# Patient Record
Sex: FEMALE | Race: BLACK OR AFRICAN AMERICAN | NOT HISPANIC OR LATINO | URBAN - METROPOLITAN AREA
[De-identification: names, ages, dates, MRNs, and addresses within clinical notes are randomized per-mention and may not be internally consistent; named-entity substitution may affect disease eponyms.]

---

## 2017-09-13 ENCOUNTER — EMERGENCY (EMERGENCY)
Facility: HOSPITAL | Age: 77
LOS: 1 days | Discharge: HOME | End: 2017-09-13
Attending: EMERGENCY MEDICINE | Admitting: EMERGENCY MEDICINE
Payer: MEDICARE

## 2017-09-13 VITALS
OXYGEN SATURATION: 98 % | RESPIRATION RATE: 16 BRPM | DIASTOLIC BLOOD PRESSURE: 71 MMHG | HEART RATE: 79 BPM | TEMPERATURE: 98 F | SYSTOLIC BLOOD PRESSURE: 106 MMHG

## 2017-09-13 VITALS — OXYGEN SATURATION: 97 % | SYSTOLIC BLOOD PRESSURE: 154 MMHG | DIASTOLIC BLOOD PRESSURE: 82 MMHG | HEART RATE: 77 BPM

## 2017-09-13 LAB
ALBUMIN SERPL ELPH-MCNC: 4 G/DL — SIGNIFICANT CHANGE UP (ref 3.3–5)
ALP SERPL-CCNC: 86 U/L — SIGNIFICANT CHANGE UP (ref 40–120)
ALT FLD-CCNC: 16 U/L RC — SIGNIFICANT CHANGE UP (ref 10–45)
ANION GAP SERPL CALC-SCNC: 14 MMOL/L — SIGNIFICANT CHANGE UP (ref 5–17)
AST SERPL-CCNC: 17 U/L — SIGNIFICANT CHANGE UP (ref 10–40)
BILIRUB SERPL-MCNC: 0.5 MG/DL — SIGNIFICANT CHANGE UP (ref 0.2–1.2)
BUN SERPL-MCNC: 14 MG/DL — SIGNIFICANT CHANGE UP (ref 7–23)
CALCIUM SERPL-MCNC: 9.7 MG/DL — SIGNIFICANT CHANGE UP (ref 8.4–10.5)
CHLORIDE SERPL-SCNC: 95 MMOL/L — LOW (ref 96–108)
CO2 SERPL-SCNC: 27 MMOL/L — SIGNIFICANT CHANGE UP (ref 22–31)
CREAT SERPL-MCNC: 1.08 MG/DL — SIGNIFICANT CHANGE UP (ref 0.5–1.3)
GAS PNL BLDV: SIGNIFICANT CHANGE UP
GLUCOSE SERPL-MCNC: 247 MG/DL — HIGH (ref 70–99)
HCT VFR BLD CALC: 38.4 % — SIGNIFICANT CHANGE UP (ref 34.5–45)
HGB BLD-MCNC: 13 G/DL — SIGNIFICANT CHANGE UP (ref 11.5–15.5)
MCHC RBC-ENTMCNC: 29.9 PG — SIGNIFICANT CHANGE UP (ref 27–34)
MCHC RBC-ENTMCNC: 33.8 GM/DL — SIGNIFICANT CHANGE UP (ref 32–36)
MCV RBC AUTO: 88.5 FL — SIGNIFICANT CHANGE UP (ref 80–100)
PLATELET # BLD AUTO: 231 K/UL — SIGNIFICANT CHANGE UP (ref 150–400)
POTASSIUM SERPL-MCNC: 4.5 MMOL/L — SIGNIFICANT CHANGE UP (ref 3.5–5.3)
POTASSIUM SERPL-SCNC: 4.5 MMOL/L — SIGNIFICANT CHANGE UP (ref 3.5–5.3)
PROT SERPL-MCNC: 8.2 G/DL — SIGNIFICANT CHANGE UP (ref 6–8.3)
RBC # BLD: 4.34 M/UL — SIGNIFICANT CHANGE UP (ref 3.8–5.2)
RBC # FLD: 13.1 % — SIGNIFICANT CHANGE UP (ref 10.3–14.5)
SODIUM SERPL-SCNC: 136 MMOL/L — SIGNIFICANT CHANGE UP (ref 135–145)
WBC # BLD: 12.7 K/UL — HIGH (ref 3.8–10.5)
WBC # FLD AUTO: 12.7 K/UL — HIGH (ref 3.8–10.5)

## 2017-09-13 PROCEDURE — 99284 EMERGENCY DEPT VISIT MOD MDM: CPT

## 2017-09-13 PROCEDURE — 85027 COMPLETE CBC AUTOMATED: CPT

## 2017-09-13 PROCEDURE — 80053 COMPREHEN METABOLIC PANEL: CPT

## 2017-09-13 PROCEDURE — 71020: CPT | Mod: 26

## 2017-09-13 PROCEDURE — 99283 EMERGENCY DEPT VISIT LOW MDM: CPT | Mod: 25

## 2017-09-13 PROCEDURE — 71046 X-RAY EXAM CHEST 2 VIEWS: CPT

## 2017-09-13 NOTE — ED PROVIDER NOTE - MEDICAL DECISION MAKING DETAILS
Prelim cxr shows no acute findings.  Clinically stable for outpatient tx. CURB 65 score of 1.  To discharge with doxycycline 100mg tab bid x7 days. Prelim cxr shows no acute findings.  Clinically stable for outpatient tx. CURB 65 score of 1.  To discharge with doxycycline 100mg tab bid x7 days.  Vitals reassessed. /67 as per RN at 6:07PM.  Stable for d/c ***Attending Jn He MD***   76F hx HTN, DM presents with cough productive of yellow-green sputum and increased fatigue, myalgias x 4days.  Pt sent in by PMD for eval, concern for PNA.  Pt tolerating PO, but decreased fluid intake.  ROS otherwise negative.  VSS.  well-appearing, in no distress.  R-sided crackles and decreased BS.  will treat for clinical PNA.  Curb-65 score of 1.  attempted to d/w PMD who has not returned call.  will d.c with doxycycline and return precautions.

## 2017-09-13 NOTE — ED ADULT NURSE NOTE - OBJECTIVE STATEMENT
Recvd pt awake, alert and responsive to all stimuli.  no sob or respiratory distress noted at this time.  pt does c/o productive cough of thick yellow sputum x 2 days associated with pleuritic chest and throat pain. pt states that she went to her pmd for assessment and was sent to ed for further evaluation.   no fever or chills noted.  pt worked up and sent for radiological studies.  will continue to monitor upon her return.

## 2017-09-13 NOTE — ED PROVIDER NOTE - OBJECTIVE STATEMENT
76 year old female with DM and HTN who presents from PMD office due to referral to come in for 1 dose of IV abx and po outpatient discharge.  Patient reports that she has been afebrile. Admits to cough for 4 days.  Denies any shortness of breath, chest pain, lightheadedness, dizziness.   No underlying lung disease and has not been sick in the past year.

## 2017-09-13 NOTE — ED PROVIDER NOTE - ATTENDING CONTRIBUTION TO CARE
76F hx HTN, DM presents with cough productive of yellow-green sputum and increased fatigue, myalgias x 4days.  Pt sent in by PMD for eval, concern for PNA.  Pt tolerating PO, but decreased fluid intake.  ROS otherwise negative.  VSS.  well-appearing, in no distress.  R-sided crackles and decreased BS.  will treat for clinical PNA.  Curb-65 score of 1.  attempted to d/w PMD who has not returned call.  will d.c with doxycycline and return precautions. ***Attending Jn He MD***   76F hx HTN, DM presents with cough productive of yellow-green sputum and increased fatigue, myalgias x 4days.  Pt sent in by PMD for eval, concern for PNA.  Pt tolerating PO, but decreased fluid intake.  ROS otherwise negative.  VSS.  well-appearing, in no distress.  R-sided crackles and decreased BS.  will treat for clinical PNA.  Curb-65 score of 1.  attempted to d/w PMD who has not returned call.  will d.c with doxycycline and return precautions.

## 2018-09-09 ENCOUNTER — EMERGENCY (EMERGENCY)
Facility: HOSPITAL | Age: 78
LOS: 1 days | Discharge: HOME | End: 2018-09-09
Attending: EMERGENCY MEDICINE
Payer: MEDICARE

## 2018-09-09 VITALS
HEART RATE: 60 BPM | RESPIRATION RATE: 15 BRPM | OXYGEN SATURATION: 99 % | SYSTOLIC BLOOD PRESSURE: 201 MMHG | DIASTOLIC BLOOD PRESSURE: 86 MMHG | TEMPERATURE: 98 F

## 2018-09-09 PROCEDURE — 70450 CT HEAD/BRAIN W/O DYE: CPT | Mod: 26

## 2018-09-09 PROCEDURE — 99284 EMERGENCY DEPT VISIT MOD MDM: CPT

## 2018-09-09 NOTE — ED ADULT TRIAGE NOTE - CHIEF COMPLAINT QUOTE
Friday  right numbness in her arm getting worse cant hold anything in the hand swellling noted Friday  right numbness in her arm getting worse cant hold anything in the hand swellling noted   pt has elevated blood pressure in both arms pt took her meds today

## 2018-09-10 VITALS
SYSTOLIC BLOOD PRESSURE: 177 MMHG | DIASTOLIC BLOOD PRESSURE: 86 MMHG | RESPIRATION RATE: 16 BRPM | TEMPERATURE: 98 F | OXYGEN SATURATION: 100 % | HEART RATE: 64 BPM

## 2018-09-10 PROBLEM — I10 ESSENTIAL (PRIMARY) HYPERTENSION: Chronic | Status: ACTIVE | Noted: 2017-09-13

## 2018-09-10 PROBLEM — E11.9 TYPE 2 DIABETES MELLITUS WITHOUT COMPLICATIONS: Chronic | Status: ACTIVE | Noted: 2017-09-13

## 2018-09-10 PROCEDURE — 82962 GLUCOSE BLOOD TEST: CPT

## 2018-09-10 PROCEDURE — 70450 CT HEAD/BRAIN W/O DYE: CPT

## 2018-09-10 PROCEDURE — 99284 EMERGENCY DEPT VISIT MOD MDM: CPT | Mod: 25

## 2018-09-10 RX ORDER — ACETAMINOPHEN 500 MG
975 TABLET ORAL ONCE
Qty: 0 | Refills: 0 | Status: COMPLETED | OUTPATIENT
Start: 2018-09-10 | End: 2018-09-10

## 2018-09-10 RX ADMIN — Medication 975 MILLIGRAM(S): at 01:13

## 2018-09-10 NOTE — ED ADULT NURSE NOTE - CHIEF COMPLAINT QUOTE
Friday  right numbness in her arm getting worse cant hold anything in the hand swellling noted   pt has elevated blood pressure in both arms pt took her meds today

## 2018-09-10 NOTE — ED PROVIDER NOTE - ATTENDING CONTRIBUTION TO CARE
ATTENDING MD:  I, Sunny Cr, personally have seen and examined this patient.  I have discussed all aspects of care with the resident physician. Resident note reviewed and agree on plan of care and except where noted.  See HPI, PE, and MDM for details.

## 2018-09-10 NOTE — ED PROVIDER NOTE - PROGRESS NOTE DETAILS
CT head negative. Splint placed. stable for DC. Pt noted to be hypertensive, advised her to f/u with her PCP in the next 2-3 days regarding hypertension.

## 2018-09-10 NOTE — ED PROVIDER NOTE - OBJECTIVE STATEMENT
78 yo F hx of htn and dm presents with right hand numbness 2 days duration. Pt reports she initially thought she slept oddly on it Friday but the pain 78 yo F hx of htn and dm presents with right hand numbness 2 days duration. The symptoms onset Friday when she woke up after a long sleep with her R arm slung over a chair on an all day bus ride from [place]. She noted paresthesias and tingling on the dorsum of her R-arm and difficulty using her hand. She thought it just fell asleep and would improve but it has not improved much over the next day so she presented to the ED for evaluation. She denies headache, neck trauma or pain, other weakness or numbness. No prior hx of stroke or blood clots, no Afib.

## 2018-09-10 NOTE — ED PROVIDER NOTE - CARE PLAN
Principal Discharge DX:	Radial nerve palsy, right  Secondary Diagnosis:	Asymptomatic hypertension Principal Discharge DX:	Radial nerve palsy, right  Assessment and plan of treatment:	1) Please follow-up with your primary care doctor in the next 1-2 days.  Please call tomorrow for an appointment.  If you cannot follow-up with your primary care doctor please return to the ED for any urgent issues.  2) You were given a copy of the tests performed today.  Please bring the results with you and review them with your primary care doctor.  3) If you have any worsening of symptoms or any other concerns please return to the ED immediately. This includes chest pain, shortness of breath, fever/chills, increased pain, or any other concerns.  4) Please continue taking your home medications as directed.  Secondary Diagnosis:	Asymptomatic hypertension Principal Discharge DX:	Radial nerve palsy, right  Assessment and plan of treatment:	You were seen and evaluated in the emergency department for numbness in the right hand. Your exam, Is highly suggestive of a peripheral neuropathy. We did not find evidence for a dangerous cause of your pain. This does not mean your pain is not symptoms are not concerning, only that we could not find a dangerous or life-threatening cause. Please read the attached information sheets as they will provide useful information regarding your condition.    You may take up to 400mg of ibuprofen (Motrin, Advil) every 6 hours as needed for pain. Please take ibuprofen with food if possible to prevent stomach upset. You may also take up to 1000mg of acetaminophen (Tylenol) every 6 hours as needed for pain. It is ok to mix these medications with each other. Do not mix them with other pain medications such as naproxen (Alieve) or asprin unless specifically instructed by your doctor. Many prescription pain medications and cough medications contain acetaminophen. If you take these medications, please discuss with your provider or primary care doctor if you need to adjust the dose of acetaminophen you can take. You may apply ice to the affected area for no more than 20 minutes as needed for comfort. You may apply ice every 2-4 times a day as needed.   It is important to understand that while your workup was reassuring, no medical workup can completely exclude all concerning conditions. Therefore, we ask that you please return if you develop new or worsening pain, trouble breathing, fainting (or feel that you might faint), weakness, inability to perform your daily activities, or other concerning new symptoms (such as listed on the attached information sheets. Please read the attached information sheets. Take your medication as prescribed.    Please be sure to follow up with your regular doctor in the next 7-10 days.   Seek immediate medical care for any new/worsening signs or symptoms.  Secondary Diagnosis:	Asymptomatic hypertension

## 2018-09-10 NOTE — ED PROVIDER NOTE - PLAN OF CARE
1) Please follow-up with your primary care doctor in the next 1-2 days.  Please call tomorrow for an appointment.  If you cannot follow-up with your primary care doctor please return to the ED for any urgent issues.  2) You were given a copy of the tests performed today.  Please bring the results with you and review them with your primary care doctor.  3) If you have any worsening of symptoms or any other concerns please return to the ED immediately. This includes chest pain, shortness of breath, fever/chills, increased pain, or any other concerns.  4) Please continue taking your home medications as directed. You were seen and evaluated in the emergency department for numbness in the right hand. Your exam, Is highly suggestive of a peripheral neuropathy. We did not find evidence for a dangerous cause of your pain. This does not mean your pain is not symptoms are not concerning, only that we could not find a dangerous or life-threatening cause. Please read the attached information sheets as they will provide useful information regarding your condition.    You may take up to 400mg of ibuprofen (Motrin, Advil) every 6 hours as needed for pain. Please take ibuprofen with food if possible to prevent stomach upset. You may also take up to 1000mg of acetaminophen (Tylenol) every 6 hours as needed for pain. It is ok to mix these medications with each other. Do not mix them with other pain medications such as naproxen (Alieve) or asprin unless specifically instructed by your doctor. Many prescription pain medications and cough medications contain acetaminophen. If you take these medications, please discuss with your provider or primary care doctor if you need to adjust the dose of acetaminophen you can take. You may apply ice to the affected area for no more than 20 minutes as needed for comfort. You may apply ice every 2-4 times a day as needed.   It is important to understand that while your workup was reassuring, no medical workup can completely exclude all concerning conditions. Therefore, we ask that you please return if you develop new or worsening pain, trouble breathing, fainting (or feel that you might faint), weakness, inability to perform your daily activities, or other concerning new symptoms (such as listed on the attached information sheets. Please read the attached information sheets. Take your medication as prescribed.    Please be sure to follow up with your regular doctor in the next 7-10 days.   Seek immediate medical care for any new/worsening signs or symptoms.

## 2018-09-10 NOTE — ED PROVIDER NOTE - PHYSICAL EXAMINATION
ATTENDING MD Shaye: GEN: well appearing, NAD, AOx4  HEAD: NCAT  EYES: eyes clear, pupils equal round and reactive to light, extra-occular movements are intact, full visual fields  ENT: mucus membranes are moist, oropharynx is within normal limits, neck supple  CV: normal rate, regular rhythm, S1, S2, 2+ radial pulses bilaterally, <2 sec cap refill in all digits  RESP: lungs clear to auscultation bilaterally, equal breath sounds bilaterally, unlabored  ABD: the abdomen is soft, nontender, and nondistended, there are no palpable masses or organomegaly  SKIN: warm, dry, no rash  MSK: No C, T or L spine tenderness, deformity or stepoff. No deformity or tenderness throughout RUE or other extremity exam. negative spurling bilaterally.   NEURO: CNII-XII intact. LUE, RLE, LLE with 5/5 strength throughout. sensation intact to light touch throughout trunk and extremities except for dorsum of R-hand and forearm where it is present but sensation decreased. RUE notable for 5/5 strength in shoulder shrug, tricep, bicep, wrist flexion. 4/5 brachioradialis strength. 2/5 strength for wrist extensors and finger extensors, 5/5 strength in finger flexors. weakness in finger and thumb abduction. no thenar wasting, negative phalen and tinel. wrist adductors (ulnar deviation) intact, weakness of abductors (radial deviation) 2+ tricep reflex bilaterally, unable to perform brachioradilis sufficiently on right to interpret

## 2018-09-10 NOTE — ED PROCEDURE NOTE - ATTENDING CONTRIBUTION TO CARE
ATTENDING MD:  I, Sunny Cr, was available in the Emergency Department throughout the entire procedure and I was present during the key portions. I agree with the procedure as documented.

## 2018-09-10 NOTE — ED PROVIDER NOTE - MEDICAL DECISION MAKING DETAILS
ATTENDING MD Shaye: Pt presents with isolated RUE weaknessa nds ensory changes. 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 Will place volar wrist splint and refer for f/u with neurology, ortho PRN

## 2018-09-10 NOTE — ED PROCEDURE NOTE - CPROC ED POST PROC CARE GUIDE1
Instructed patient/caregiver to follow-up with primary care physician./instructed patient to follow up with neurology and ortho PRN/Verbal/written post procedure instructions were given to patient/caregiver.

## 2018-09-10 NOTE — ED ADULT NURSE NOTE - OBJECTIVE STATEMENT
78 y/o female presents to the ED with R arm numbness since Friday and right sided neck pain with movement. Denies HA, extremity weakness, slurred speech and facial droop. R arm has decreased sensation when compared to L arm.  strong and equal bilaterally. Patient has full movement of bilateral extremities but R hand is stiff and postured inward towards body. Patient speaking in complete clear sentences. Pupils PERRLA. Capillary refill less than 2 seconds. Skin warm and dry. Peripheral pulses equal bilaterally. Denies CP, SOB, N/V/D, abdominal pain and dysuria. A&Ox3. VSS

## 2018-09-10 NOTE — ED PROCEDURE NOTE - NS ED PERI NEURO POS
stable/Post-application: Motor, sensory, and vascular responses NOT intact in the injured extremity./The patient/caregiver verbalized understanding of how to care for the injured extremity with splint./Pre-application: Motor, sensory, and vascular responses NOT intact in the injured extremity.

## 2020-02-13 ENCOUNTER — APPOINTMENT (OUTPATIENT)
Dept: CARDIOLOGY | Facility: CLINIC | Age: 80
End: 2020-02-13
Payer: MEDICARE

## 2020-02-13 ENCOUNTER — NON-APPOINTMENT (OUTPATIENT)
Age: 80
End: 2020-02-13

## 2020-02-13 VITALS
DIASTOLIC BLOOD PRESSURE: 80 MMHG | SYSTOLIC BLOOD PRESSURE: 144 MMHG | HEART RATE: 70 BPM | HEIGHT: 66 IN | OXYGEN SATURATION: 98 % | WEIGHT: 156 LBS | BODY MASS INDEX: 25.07 KG/M2

## 2020-02-13 DIAGNOSIS — R09.89 OTHER SPECIFIED SYMPTOMS AND SIGNS INVOLVING THE CIRCULATORY AND RESPIRATORY SYSTEMS: ICD-10-CM

## 2020-02-13 DIAGNOSIS — Z83.3 FAMILY HISTORY OF DIABETES MELLITUS: ICD-10-CM

## 2020-02-13 PROCEDURE — 99205 OFFICE O/P NEW HI 60 MIN: CPT

## 2020-02-13 PROCEDURE — 93000 ELECTROCARDIOGRAM COMPLETE: CPT

## 2020-02-13 NOTE — REASON FOR VISIT
[Abnormal ECG] : an abnormal ECG [Initial Evaluation] : an initial evaluation of [Hyperlipidemia] : hyperlipidemia [Hypertension] : hypertension

## 2020-02-26 ENCOUNTER — APPOINTMENT (OUTPATIENT)
Dept: CARDIOLOGY | Facility: CLINIC | Age: 80
End: 2020-02-26
Payer: MEDICARE

## 2020-02-26 PROCEDURE — 93306 TTE W/DOPPLER COMPLETE: CPT

## 2020-02-26 PROCEDURE — 93880 EXTRACRANIAL BILAT STUDY: CPT

## 2020-03-18 ENCOUNTER — APPOINTMENT (OUTPATIENT)
Dept: CARDIOLOGY | Facility: CLINIC | Age: 80
End: 2020-03-18

## 2020-03-19 NOTE — PHYSICAL EXAM
[Normal Appearance] : normal appearance [Well Groomed] : well groomed [General Appearance - Well Developed] : well developed [General Appearance - Well Nourished] : well nourished [No Deformities] : no deformities [Normal Conjunctiva] : the conjunctiva exhibited no abnormalities [Eyelids - No Xanthelasma] : the eyelids demonstrated no xanthelasmas [General Appearance - In No Acute Distress] : no acute distress [No Oral Cyanosis] : no oral cyanosis [No Oral Pallor] : no oral pallor [Normal Oral Mucosa] : normal oral mucosa [Normal Jugular Venous A Waves Present] : normal jugular venous A waves present [Normal Jugular Venous V Waves Present] : normal jugular venous V waves present [No Jugular Venous Hinojosa A Waves] : no jugular venous hinojosa A waves [Murmurs] : no murmurs present [Heart Rate And Rhythm] : heart rate and rhythm were normal [Heart Sounds] : normal S1 and S2 [Edema] : no peripheral edema present [Left Carotid Bruit] : left carotid bruit heard [1+] : right 1+ [Auscultation Breath Sounds / Voice Sounds] : lungs were clear to auscultation bilaterally [Respiration, Rhythm And Depth] : normal respiratory rhythm and effort [Exaggerated Use Of Accessory Muscles For Inspiration] : no accessory muscle use [Abdomen Soft] : soft [Bowel Sounds] : normal bowel sounds [Abdomen Tenderness] : non-tender [Abnormal Walk] : normal gait [Gait - Sufficient For Exercise Testing] : the gait was sufficient for exercise testing [Petechial Hemorrhages (___cm)] : no petechial hemorrhages [Nail Clubbing] : no clubbing of the fingernails [Cyanosis, Localized] : no localized cyanosis [Skin Color & Pigmentation] : normal skin color and pigmentation [] : no ischemic changes [Skin Lesions] : no skin lesions [No Skin Ulcers] : no skin ulcer [No Venous Stasis] : no venous stasis [Oriented To Time, Place, And Person] : oriented to person, place, and time [No Xanthoma] : no  xanthoma was observed [Affect] : the affect was normal [No Anxiety] : not feeling anxious [Mood] : the mood was normal [Right Carotid Bruit] : no bruit heard over the right carotid [Bruit] : no bruit heard

## 2020-03-19 NOTE — HISTORY OF PRESENT ILLNESS
[FreeTextEntry1] : Luisa is a pleasant 79-year-old female with hypertension, dyslipidemia and type 2 diabetes comes to the office to establish cardiac care. She is seen to have an abnormal ECG. Reportedly eating generally healthy and a balanced diet.Perhaps on more moderate activities there is an element of exertional dyspnea. No active chest pain noted.

## 2020-03-19 NOTE — DISCUSSION/SUMMARY
[FreeTextEntry1] : Continue with her current antihypertensive medication regimen of Toprol, lisinopril, hydrochlorothiazide amlodipine. Stay on atorvastatin for lipid-lowering along with glycemic lower medications for diabetic control. To further cardiac risk stratify, I have ordered an echocardiogram to assess LV function and valvular status. To better assess carotid disease burden and serve as a cardiovascular risk marker, especially with the possibility of a left carotid bruit on exam, I've ordered a carotid bilateral duplex study. To assess underlying coronary disease burden with current cardiovascular disease risk factors and symptoms, I have ordered a nuclear perfusion stress test.I recommended a heart healthy lifestyle including a low-saturated fat, low cholesterol diet with improved aerobic physical fitness over time for cardiovascular benefits. Carbohydrate and sodium restriction along with weight loss over time encouraged. We will call the patient with test results and followup with you for general care. See me in 3 months or sooner if needed.

## 2020-08-28 NOTE — ED ADULT NURSE NOTE - DISCHARGE DATE/TIME
Problem: Mobility Impaired (Adult and Pediatric)  Goal: *Acute Goals and Plan of Care (Insert Text)  Description: FUNCTIONAL STATUS PRIOR TO ADMISSION: Patient reports she was ambulatory in her house without AD, denies any recent falls. HOME SUPPORT PRIOR TO ADMISSION: Patient lives with two of her sons (only one can provide assist) in a tri level home. Patient has had multiple hospital admissions for DKA and requires increased care. Physical Therapy Goals  Initiated 8/28/2020  1. Patient will move from supine to sit and sit to supine  in bed with independence within 7 day(s). 2.  Patient will transfer from bed to chair and chair to bed with independence using the least restrictive device within 7 day(s). 3.  Patient will perform sit to stand with independence within 7 day(s). 4.  Patient will ambulate with supervision/set-up for 200 feet with the least restrictive device within 7 day(s). 5.  Patient will ascend/descend 6 stairs with  handrail(s) with minimal assistance/contact guard assist within 7 day(s). Outcome: Not Met   PHYSICAL THERAPY EVALUATION  Patient: Edmund Snyder (4 y.o. female)  Date: 8/28/2020  Primary Diagnosis: DKA (diabetic ketoacidoses) ([facility] [address] [E11.10]        Precautions: falls       ASSESSMENT  Based on the objective data described below, the patient presents with general weakness and decreased tolerance of activity, unsteady gait, mild confusion and forgetfulness, and impaired balance/decreased safety awareness which increases falls risk. Patient received in bed and agreeable to participate, able to come to edge with supervision and verbal cuing for sequence, sitting balance is intact. Patient stood with CGA and noted to have soiled chucks on bed, ambulated into bathroom to use toilet and was able to perform some self care for clean up in standing position. Patient washed hands at sink without cuing, but needed assist with walker management for safety.   Patient ambulated in the hallway x approx 120 feet with RW and CGA, mild unsteadiness but no overt LOB. Patient was fatigued post activity, VSS on RA, and left sitting up in chair. Patient should be able to return home with increased family assistance for mobility and med management, HHPT and personal care aid. Patient is not safe to be left unsupervised, and would also benefit from a rolling walker for home (unclear if she has one or not). Current Level of Function Impacting Discharge (mobility/balance): CGA, verbal cuing for safety and sequence    Functional Outcome Measure: The patient scored 60/100 on the Barthel outcome measure which is indicative of moderate functional impairment      Other factors to consider for discharge: increased falls risk, multiple re-hospitalizations, needs increased assist and supervision at home     Patient will benefit from skilled therapy intervention to address the above noted impairments. PLAN :  Recommendations and Planned Interventions: bed mobility training, transfer training, gait training, therapeutic exercises, patient and family training/education, and therapeutic activities      Frequency/Duration: Patient will be followed by physical therapy:  4 times a week to address goals. Recommendation for discharge: (in order for the patient to meet his/her long term goals)  Physical therapy at least 2 days/week in the home AND ensure assist and/or supervision for safety with mobility     This discharge recommendation:  Has been made in collaboration with the attending provider and/or case management    IF patient discharges home will need the following DME: rolling walker         SUBJECTIVE:   Patient stated my legs feel wobbly.     OBJECTIVE DATA SUMMARY:   HISTORY:    Past Medical History:   Diagnosis Date    Heart failure ([address]     unknown to family    Hypertension     Stroke [facility])      Past Surgical History:   Procedure Laterality Date    HX GYN      HX HEENT thyroidectomy    HX HYSTERECTOMY      REMOVAL GALLBLADDER      THYROIDECTOMY         Personal factors and/or comorbidities impacting plan of care: poorly controlled DM, frequent hosp stays    Home Situation  Home Environment: Private residence  One/Two Story Residence: Two story  Living Alone: No  Support Systems: Child(oumar), Family member(s)  Patient Expects to be Discharged to[de-identified] Private residence  Current DME Used/Available at Home: None    EXAMINATION/PRESENTATION/DECISION MAKING:   Critical Behavior:  Neurologic State: Alert, Confused  Orientation Level: Oriented to person, Disoriented to time, Disoriented to situation, Disoriented to place  Cognition: Follows commands     Hearing: Auditory  Auditory Impairment: None  Range Of Motion:  AROM: Within functional limits           PROM: Within functional limits           Strength:    Strength: Generally decreased, functional                    Tone & Sensation:   Tone: Normal              Sensation: Intact               Coordination:  Coordination: Within functional limits  Vision:      Functional Mobility:  Bed Mobility:  Rolling: Supervision  Supine to Sit: Supervision        Transfers:  Sit to Stand: Contact guard assistance  Stand to Sit: Contact guard assistance        Bed to Chair: Contact guard assistance              Balance:   Sitting: Intact  Standing: Impaired  Standing - Static: Constant support;Good  Standing - Dynamic : Constant support; Fair  Ambulation/Gait Training:  Distance (ft): 120 Feet (ft)  Assistive Device: Gait belt;Walker, rolling  Ambulation - Level of Assistance: Contact guard assistance        Gait Abnormalities: Decreased step clearance        Base of Support: Widened     Speed/Deepika: Pace decreased (<100 feet/min)  Step Length: Left shortened;Right shortened                     Stairs:                Functional Measure:  Barthel Index:    Bathin  Bladder: 5  Bowels: 5  Groomin  Dressin  Feeding: 10  Mobility: 10  Stairs: 5  Toilet Use: 5  Transfer (Bed to Chair and Back): 10  Total: 60/100       The Barthel ADL Index: Guidelines  1. The index should be used as a record of what a patient does, not as a record of what a patient could do. 2. The main aim is to establish degree of independence from any help, physical or verbal, however minor and for whatever reason. 3. The need for supervision renders the patient not independent. 4. A patient's performance should be established using the best available evidence. Asking the patient, friends/relatives and nurses are the usual sources, but direct observation and common sense are also important. However direct testing is not needed. 5. Usually the patient's performance over the preceding 24-48 hours is important, but occasionally longer periods will be relevant. 6. Middle categories imply that the patient supplies over 50 per cent of the effort. 7. Use of aids to be independent is allowed. Kristan Lopez., Barthel, D.W. (1920). Functional evaluation: the Barthel Index. [facility] (14)2. Laura Gonzales mathieu RAMO DiehlF, Fransisco Dowling., Saw Mendez., [place], [address] [place] [address] (1999). Measuring the change indisability after inpatient rehabilitation; comparison of the responsiveness of the Barthel Index and Functional [place] Measure. Journal of Neurology, Neurosurgery, and Psychiatry, 66(4), 312-760. JING Bhardwaj.CELINE, BRENNA Mckay, & Susan Salazar, M.A. (2004.) Assessment of post-stroke quality of life in cost-effectiveness studies: The usefulness of the Barthel Index and the EuroQoL-5D.  Quality of Life Research, 15, 350-20           Physical Therapy Evaluation Charge Determination   History Examination Presentation Decision-Making   MEDIUM  Complexity : 1-2 comorbidities / personal factors will impact the outcome/ POC  MEDIUM Complexity : 3 Standardized tests and measures addressing body structure, function, activity limitation and / or participation in recreation  MEDIUM Complexity : Evolving with changing characteristics  MEDIUM Complexity : FOTO score of 26-74      Based on the above components, the patient evaluation is determined to be of the following complexity level: MEDIUM    Pain Rating:      Activity Tolerance:   Fair  Please refer to the flowsheet for vital signs taken during this treatment. After treatment patient left in no apparent distress:   Sitting in chair, Call bell within reach, and Bed / chair alarm activated    COMMUNICATION/EDUCATION:   The patients plan of care was discussed with: Registered nurse and Case management. Fall prevention education was provided and the patient/caregiver indicated understanding. and Patient/family agree to work toward stated goals and plan of care.     Thank you for this referral.  Vin Hummel, PT   Time Calculation: 47 mins 10-Sep-2018 02:51

## 2021-04-15 NOTE — ED ADULT NURSE NOTE - RESPIRATORY WDL
Addended byAlessandro NUNES on: 4/15/2021 03:17 PM     Modules accepted: Orders
Breathing spontaneous and unlabored. Breath sounds clear and equal bilaterally with regular rhythm.

## 2021-11-24 ENCOUNTER — NON-APPOINTMENT (OUTPATIENT)
Age: 81
End: 2021-11-24

## 2021-11-24 ENCOUNTER — APPOINTMENT (OUTPATIENT)
Dept: INTERNAL MEDICINE | Facility: CLINIC | Age: 81
End: 2021-11-24
Payer: MEDICARE

## 2021-11-24 VITALS
DIASTOLIC BLOOD PRESSURE: 80 MMHG | HEART RATE: 76 BPM | WEIGHT: 152 LBS | BODY MASS INDEX: 24.43 KG/M2 | SYSTOLIC BLOOD PRESSURE: 125 MMHG | OXYGEN SATURATION: 98 % | HEIGHT: 66 IN | TEMPERATURE: 97.6 F

## 2021-11-24 VITALS — DIASTOLIC BLOOD PRESSURE: 80 MMHG | SYSTOLIC BLOOD PRESSURE: 150 MMHG

## 2021-11-24 DIAGNOSIS — Z23 ENCOUNTER FOR IMMUNIZATION: ICD-10-CM

## 2021-11-24 DIAGNOSIS — Z80.3 FAMILY HISTORY OF MALIGNANT NEOPLASM OF BREAST: ICD-10-CM

## 2021-11-24 DIAGNOSIS — Z84.1 FAMILY HISTORY OF DISORDERS OF KIDNEY AND URETER: ICD-10-CM

## 2021-11-24 DIAGNOSIS — Z63.5 DISRUPTION OF FAMILY BY SEPARATION AND DIVORCE: ICD-10-CM

## 2021-11-24 DIAGNOSIS — Z87.891 PERSONAL HISTORY OF NICOTINE DEPENDENCE: ICD-10-CM

## 2021-11-24 PROCEDURE — 99204 OFFICE O/P NEW MOD 45 MIN: CPT | Mod: 25

## 2021-11-24 PROCEDURE — 93000 ELECTROCARDIOGRAM COMPLETE: CPT

## 2021-11-24 PROCEDURE — G0009: CPT

## 2021-11-24 PROCEDURE — G0439: CPT

## 2021-11-24 PROCEDURE — 90670 PCV13 VACCINE IM: CPT

## 2021-11-24 RX ORDER — METFORMIN HYDROCHLORIDE 500 MG/1
500 TABLET, COATED ORAL
Qty: 180 | Refills: 3 | Status: DISCONTINUED | COMMUNITY
End: 2021-11-24

## 2021-11-24 SDOH — SOCIAL STABILITY - SOCIAL INSECURITY: DISRUPTION OF FAMILY BY SEPARATION AND DIVORCE: Z63.5

## 2021-11-24 NOTE — HEALTH RISK ASSESSMENT
[Good] : ~his/her~  mood as  good [Never (0 pts)] : Never (0 points) [No] : In the past 12 months have you used drugs other than those required for medical reasons? No [No falls in past year] : Patient reported no falls in the past year [0] : 2) Feeling down, depressed, or hopeless: Not at all (0) [PHQ-2 Negative - No further assessment needed] : PHQ-2 Negative - No further assessment needed [Patient reported mammogram was normal] : Patient reported mammogram was normal [Patient reported PAP Smear was normal] : Patient reported PAP Smear was normal [Patient reported colonoscopy was abnormal] : Patient reported colonoscopy was abnormal [HIV test declined] : HIV test declined [Hepatitis C test declined] : Hepatitis C test declined [Change in mental status noted] : Change in mental status noted [None] : None [With Family] : lives with family [Retired] : retired [High School] : high school [# Of Children ___] : has [unfilled] children [Feels Safe at Home] : Feels safe at home [Fully functional (bathing, dressing, toileting, transferring, walking, feeding)] : Fully functional (bathing, dressing, toileting, transferring, walking, feeding) [Fully functional (using the telephone, shopping, preparing meals, housekeeping, doing laundry, using] : Fully functional and needs no help or supervision to perform IADLs (using the telephone, shopping, preparing meals, housekeeping, doing laundry, using transportation, managing medications and managing finances) [Smoke Detector] : smoke detector [Carbon Monoxide Detector] : carbon monoxide detector [Seat Belt] :  uses seat belt [Sunscreen] : uses sunscreen [With Patient/Caregiver] : , with patient/caregiver [Designated Healthcare Proxy] : Designated healthcare proxy [Name: ___] : Health Care Proxy's Name: [unfilled]  [Relationship: ___] : Relationship: [unfilled] [] : No [Audit-CScore] : 0 [[id number]] : 0 [High Risk Behavior] : no high risk behavior [Reports changes in hearing] : Reports no changes in hearing [Reports changes in vision] : Reports no changes in vision [Guns at Home] : no guns at home [Travel to Developing Areas] : does not  travel to developing areas [MammogramDate] : 01/19 [MammogramComments] : as per pt [PapSmearDate] : 10/21 [PapSmearComments] : as per pt. seen GYN 1 mo ago [ColonoscopyDate] : 01/19 [ColonoscopyComments] : polyps as per pt-pt to release [de-identified] : delayed short term memory- names of occasional acquaintances. able to do finance [de-identified] : daughter [FreeTextEntry3] : separate [de-identified] : seen ophth this yr [de-identified] : pt has dentures [AdvancecareDate] : 11/21

## 2021-11-24 NOTE — PHYSICAL EXAM
[No Acute Distress] : no acute distress [Well Nourished] : well nourished [Well Developed] : well developed [Well-Appearing] : well-appearing [Normal Sclera/Conjunctiva] : normal sclera/conjunctiva [PERRL] : pupils equal round and reactive to light [Normal Outer Ear/Nose] : the outer ears and nose were normal in appearance [Normal Oropharynx] : the oropharynx was normal [Normal TMs] : both tympanic membranes were normal [No Lymphadenopathy] : no lymphadenopathy [Supple] : supple [No Respiratory Distress] : no respiratory distress  [No Accessory Muscle Use] : no accessory muscle use [Clear to Auscultation] : lungs were clear to auscultation bilaterally [Normal Rate] : normal rate  [Regular Rhythm] : with a regular rhythm [Normal S1, S2] : normal S1 and S2 [No Carotid Bruits] : no carotid bruits [Pedal Pulses Present] : the pedal pulses are present [No Edema] : there was no peripheral edema [Soft] : abdomen soft [Non Tender] : non-tender [Non-distended] : non-distended [No Masses] : no abdominal mass palpated [Normal Bowel Sounds] : normal bowel sounds [Normal Supraclavicular Nodes] : no supraclavicular lymphadenopathy [Normal Axillary Nodes] : no axillary lymphadenopathy [Normal Posterior Cervical Nodes] : no posterior cervical lymphadenopathy [Normal Anterior Cervical Nodes] : no anterior cervical lymphadenopathy [Normal Inguinal Nodes] : no inguinal lymphadenopathy [Grossly Normal Strength/Tone] : grossly normal strength/tone [No Focal Deficits] : no focal deficits [Normal Gait] : normal gait [Normal Affect] : the affect was normal [Normal Insight/Judgement] : insight and judgment were intact [de-identified] : thyroid nodule [de-identified] : murmur

## 2021-11-24 NOTE — PLAN
[FreeTextEntry1] : rec Moderna COVId booster\par EKG- NSR 71 T wave inv - no chg c/o 2/13/20\par pt to bring in her medicationw next visit\par pneum 13 vac given\par

## 2021-11-24 NOTE — HISTORY OF PRESENT ILLNESS
[FreeTextEntry1] : CPE [de-identified] : vaccine- refused flu vac, pneum vac. got J+J vac- 2/21\par exercise- walking 20 min- 2-3 day/wk\par DM- pt is unclear about the meds she is taking.  compliant w  meds.  non compliance w diet- 3 /wk candy\par HTN- reviewed Cardio notes, EKG 2/13/20 .  reviewed 2/26/20 ECHO- dilated LA, mild TR, MR.  home /75.  not taken BP meds today\par Lipid-? unclear if she is take statin.

## 2021-11-29 LAB
ALBUMIN SERPL ELPH-MCNC: 4.6 G/DL
ALP BLD-CCNC: 100 U/L
ALT SERPL-CCNC: 16 U/L
ANION GAP SERPL CALC-SCNC: 16 MMOL/L
AST SERPL-CCNC: 16 U/L
BASOPHILS # BLD AUTO: 0.06 K/UL
BASOPHILS NFR BLD AUTO: 0.9 %
BILIRUB SERPL-MCNC: 0.4 MG/DL
BUN SERPL-MCNC: 16 MG/DL
CALCIUM SERPL-MCNC: 10.2 MG/DL
CHLORIDE SERPL-SCNC: 100 MMOL/L
CHOLEST SERPL-MCNC: 299 MG/DL
CO2 SERPL-SCNC: 24 MMOL/L
CREAT SERPL-MCNC: 1.03 MG/DL
CREAT SPEC-SCNC: 357 MG/DL
EOSINOPHIL # BLD AUTO: 0.14 K/UL
EOSINOPHIL NFR BLD AUTO: 2 %
ESTIMATED AVERAGE GLUCOSE: 272 MG/DL
GLUCOSE SERPL-MCNC: 182 MG/DL
HBA1C MFR BLD HPLC: 11.1 %
HCT VFR BLD CALC: 42.8 %
HDLC SERPL-MCNC: 52 MG/DL
HGB BLD-MCNC: 13.5 G/DL
IMM GRANULOCYTES NFR BLD AUTO: 0.1 %
LDLC SERPL CALC-MCNC: 213 MG/DL
LDLC SERPL DIRECT ASSAY-MCNC: 218 MG/DL
LYMPHOCYTES # BLD AUTO: 3.03 K/UL
LYMPHOCYTES NFR BLD AUTO: 43.1 %
MAN DIFF?: NORMAL
MCHC RBC-ENTMCNC: 28.8 PG
MCHC RBC-ENTMCNC: 31.5 GM/DL
MCV RBC AUTO: 91.5 FL
MICROALBUMIN 24H UR DL<=1MG/L-MCNC: 7.7 MG/DL
MICROALBUMIN/CREAT 24H UR-RTO: 21 MG/G
MONOCYTES # BLD AUTO: 0.58 K/UL
MONOCYTES NFR BLD AUTO: 8.3 %
NEUTROPHILS # BLD AUTO: 3.21 K/UL
NEUTROPHILS NFR BLD AUTO: 45.6 %
NONHDLC SERPL-MCNC: 247 MG/DL
PLATELET # BLD AUTO: 241 K/UL
POTASSIUM SERPL-SCNC: 4.8 MMOL/L
PROT SERPL-MCNC: 7.6 G/DL
RBC # BLD: 4.68 M/UL
RBC # FLD: 13.9 %
SODIUM SERPL-SCNC: 140 MMOL/L
T4 FREE SERPL-MCNC: 1.3 NG/DL
TRIGL SERPL-MCNC: 171 MG/DL
TSH SERPL-ACNC: 0.86 UIU/ML
WBC # FLD AUTO: 7.03 K/UL

## 2021-12-20 ENCOUNTER — APPOINTMENT (OUTPATIENT)
Dept: ULTRASOUND IMAGING | Facility: CLINIC | Age: 81
End: 2021-12-20

## 2021-12-22 RX ORDER — ATORVASTATIN CALCIUM 20 MG/1
20 TABLET, FILM COATED ORAL
Qty: 90 | Refills: 1 | Status: DISCONTINUED | COMMUNITY
End: 2021-12-22

## 2021-12-29 ENCOUNTER — APPOINTMENT (OUTPATIENT)
Dept: CARDIOLOGY | Facility: CLINIC | Age: 81
End: 2021-12-29
Payer: MEDICARE

## 2021-12-29 PROCEDURE — 93306 TTE W/DOPPLER COMPLETE: CPT

## 2021-12-31 ENCOUNTER — NON-APPOINTMENT (OUTPATIENT)
Age: 81
End: 2021-12-31

## 2022-01-20 ENCOUNTER — APPOINTMENT (OUTPATIENT)
Dept: CARDIOLOGY | Facility: CLINIC | Age: 82
End: 2022-01-20

## 2022-02-03 ENCOUNTER — APPOINTMENT (OUTPATIENT)
Dept: CARDIOLOGY | Facility: CLINIC | Age: 82
End: 2022-02-03
Payer: MEDICARE

## 2022-02-03 ENCOUNTER — NON-APPOINTMENT (OUTPATIENT)
Age: 82
End: 2022-02-03

## 2022-02-03 VITALS
DIASTOLIC BLOOD PRESSURE: 80 MMHG | OXYGEN SATURATION: 98 % | HEART RATE: 74 BPM | WEIGHT: 157 LBS | BODY MASS INDEX: 25.23 KG/M2 | SYSTOLIC BLOOD PRESSURE: 160 MMHG | TEMPERATURE: 97.9 F | HEIGHT: 66 IN

## 2022-02-03 PROCEDURE — 93000 ELECTROCARDIOGRAM COMPLETE: CPT

## 2022-02-03 PROCEDURE — 99214 OFFICE O/P EST MOD 30 MIN: CPT

## 2022-02-03 RX ORDER — METOPROLOL TARTRATE 50 MG/1
50 TABLET, FILM COATED ORAL DAILY
Qty: 90 | Refills: 3 | Status: DISCONTINUED | COMMUNITY
End: 2022-02-03

## 2022-02-03 RX ORDER — HYDROCHLOROTHIAZIDE 12.5 MG/1
12.5 TABLET ORAL DAILY
Qty: 90 | Refills: 3 | Status: DISCONTINUED | COMMUNITY
End: 2022-02-03

## 2022-02-03 NOTE — CARDIOLOGY SUMMARY
[de-identified] : Sinus  Rhythm  -With rate variation  cv = 14. -Old anterior infarct.   -  Nonspecific T-abnormality.   ABNORMAL

## 2022-02-03 NOTE — DISCUSSION/SUMMARY
[FreeTextEntry1] : I have asked that she resume her lisinopril 20 mg daily plus metoprolol ER 50 mg daily for blood pressure management.  Continue her insulin in the form of Lantus and glimepiride for blood glycemic management in the setting of uncontrolled type 2 diabetes.  I am prescribing Crestor 20 mg daily for lipid lowering.  Follow-up labs in about 3 to 4 months.  Sodium and starch restriction emphasized along with low-fat low-cholesterol diet with age-appropriate activity.  Follow home blood pressure readings.  To further cardiac risk stratify, I have ordered an echocardiogram to assess LV function and valvular status.  I recommended a heart healthy lifestyle including a low-saturated fat, low cholesterol diet with improved aerobic physical fitness over time for cardiovascular benefits.  See me in 6 months or sooner if needed.

## 2022-02-03 NOTE — REASON FOR VISIT
[Abnormal ECG] : an abnormal ECG [Hyperlipidemia] : hyperlipidemia [Hypertension] : hypertension [Follow-Up - Clinic] : a clinic follow-up of

## 2022-02-03 NOTE — HISTORY OF PRESENT ILLNESS
[FreeTextEntry1] : Jeremy is 81 years of age with type 2 diabetes, hypertension, dyslipidemia and some memory disturbance who comes to the office to follow-up.  No current chest symptoms reported.  Some confusion as to her current medication regimen. She is no longer taking Metformin or amlodipine sounds.  It is also unclear and probable that she is not taking atorvastatin.  I reviewed her available labs which are recently performed indicating elevated lipid profile with LDL of 213 mg/dL and hemoglobin A1c of 11.1%. No lesions; no rash

## 2022-02-28 ENCOUNTER — APPOINTMENT (OUTPATIENT)
Dept: INTERNAL MEDICINE | Facility: CLINIC | Age: 82
End: 2022-02-28
Payer: MEDICARE

## 2022-03-22 ENCOUNTER — APPOINTMENT (OUTPATIENT)
Dept: INTERNAL MEDICINE | Facility: CLINIC | Age: 82
End: 2022-03-22
Payer: MEDICARE

## 2022-03-22 VITALS
OXYGEN SATURATION: 97 % | DIASTOLIC BLOOD PRESSURE: 70 MMHG | WEIGHT: 154 LBS | BODY MASS INDEX: 24.75 KG/M2 | HEIGHT: 66 IN | HEART RATE: 73 BPM | SYSTOLIC BLOOD PRESSURE: 130 MMHG

## 2022-03-22 DIAGNOSIS — Z86.79 PERSONAL HISTORY OF OTHER DISEASES OF THE CIRCULATORY SYSTEM: ICD-10-CM

## 2022-03-22 DIAGNOSIS — E11.9 TYPE 2 DIABETES MELLITUS W/OUT COMPLICATIONS: ICD-10-CM

## 2022-03-22 PROCEDURE — 99214 OFFICE O/P EST MOD 30 MIN: CPT | Mod: 25

## 2022-03-22 RX ORDER — INSULIN GLARGINE 100 [IU]/ML
100 INJECTION, SOLUTION SUBCUTANEOUS AT BEDTIME
Refills: 0 | Status: DISCONTINUED | COMMUNITY
End: 2022-03-22

## 2022-03-22 RX ORDER — GLIMEPIRIDE 1 MG/1
1 TABLET ORAL DAILY
Qty: 90 | Refills: 0 | Status: DISCONTINUED | COMMUNITY
End: 2022-03-22

## 2022-03-22 NOTE — PLAN
[FreeTextEntry1] : refused tdap, flu vac.  rec shingrix vac\par non fasting labs\par DM- stop glimepiride.  start metformin.  pt to ck glucose more freq.  pt will call me in 2 wk w glucose numbers\par stop raisin bran, ice cream\par pt to release last colonoscopy\par

## 2022-03-22 NOTE — HISTORY OF PRESENT ILLNESS
[FreeTextEntry1] : DM [de-identified] : vaccine- refused flu vac,  rec tdap , shingrix \par exercise- walking 20 min- 2-3 day/wk\par DM- pt is  compliant w  meds.  non compliance w diet- no eating candy. incr lantus last visit - pt states she is taking 30 units of lantus. rec to Endo.  eating ice cream every few wk.  eating raisin bran cereal\par fasting glucose 140-160\par HTN- reviewed 2/3/22 Cardio notes, EKG 2/13/20 .  reviewed 2/26/20 ECHO- dilated LA, mild TR, MR.  home /75.  - rpt ECHO rec and f/u in 6 monot taken BP meds today\par Lipid-2/3/22 Cardio notes- chg to crestor\par murmur- reviewed 2/26/20- diastolic dysf, mild TR.  EF 55-60'\par thyroid nodule- rec US \par rash on leg\par lower abd pain for a few days- resolves after she goes to the bathroom

## 2022-03-22 NOTE — PHYSICAL EXAM
[Right Foot Was Examined] : Right foot ~C was examined [Left Foot Was Examined] : left foot ~C was examined [] : normal [de-identified] : black colored macular rash below the knee [de-identified] : scaly rash [TWNoteComboBox5] : +2 [TWNoteComboBox6] : +2

## 2022-03-23 LAB
ALBUMIN SERPL ELPH-MCNC: 4.4 G/DL
ALP BLD-CCNC: 84 U/L
ALT SERPL-CCNC: 18 U/L
ANION GAP SERPL CALC-SCNC: 13 MMOL/L
AST SERPL-CCNC: 17 U/L
BILIRUB SERPL-MCNC: <0.2 MG/DL
BUN SERPL-MCNC: 20 MG/DL
CALCIUM SERPL-MCNC: 9.8 MG/DL
CHLORIDE SERPL-SCNC: 99 MMOL/L
CO2 SERPL-SCNC: 26 MMOL/L
CREAT SERPL-MCNC: 1.06 MG/DL
EGFR: 53 ML/MIN/1.73M2
ESTIMATED AVERAGE GLUCOSE: 252 MG/DL
GLUCOSE SERPL-MCNC: 317 MG/DL
HBA1C MFR BLD HPLC: 10.4 %
POTASSIUM SERPL-SCNC: 4.1 MMOL/L
PROT SERPL-MCNC: 7.4 G/DL
SODIUM SERPL-SCNC: 138 MMOL/L

## 2022-03-24 RX ORDER — METFORMIN HYDROCHLORIDE 850 MG/1
850 TABLET, COATED ORAL TWICE DAILY
Qty: 60 | Refills: 3 | Status: DISCONTINUED | COMMUNITY
Start: 2022-03-22 | End: 2022-03-24

## 2022-05-25 ENCOUNTER — APPOINTMENT (OUTPATIENT)
Dept: INTERNAL MEDICINE | Facility: CLINIC | Age: 82
End: 2022-05-25
Payer: MEDICARE

## 2022-05-25 VITALS
TEMPERATURE: 97.3 F | HEIGHT: 66 IN | OXYGEN SATURATION: 97 % | BODY MASS INDEX: 24.91 KG/M2 | WEIGHT: 155 LBS | HEART RATE: 76 BPM | SYSTOLIC BLOOD PRESSURE: 130 MMHG | DIASTOLIC BLOOD PRESSURE: 80 MMHG

## 2022-05-25 PROCEDURE — 99213 OFFICE O/P EST LOW 20 MIN: CPT

## 2022-05-25 NOTE — HISTORY OF PRESENT ILLNESS
[FreeTextEntry8] : Pt reports an accidental fall 1 week ago. Presents for evaluation. C/o forehead tenderness on touching. Also slight stiffness of neck.\par Otherwise feels well. Denies dizziness,blurred vision,weakness. No numbness or tingling sensation.\par No fever,chills.\par \par Pt never seen endocrinologist.

## 2022-05-25 NOTE — PHYSICAL EXAM
[No Acute Distress] : no acute distress [Well Nourished] : well nourished [Well Developed] : well developed [Normal Sclera/Conjunctiva] : normal sclera/conjunctiva [PERRL] : pupils equal round and reactive to light [EOMI] : extraocular movements intact [No Respiratory Distress] : no respiratory distress  [No Accessory Muscle Use] : no accessory muscle use [Clear to Auscultation] : lungs were clear to auscultation bilaterally [Normal Rate] : normal rate  [Regular Rhythm] : with a regular rhythm [Normal S1, S2] : normal S1 and S2 [Pedal Pulses Present] : the pedal pulses are present [No Edema] : there was no peripheral edema [Alert and Oriented x3] : oriented to person, place, and time [Normal Mood] : the mood was normal [de-identified] : forehead slight tenderness

## 2022-06-14 ENCOUNTER — APPOINTMENT (OUTPATIENT)
Dept: INTERNAL MEDICINE | Facility: CLINIC | Age: 82
End: 2022-06-14

## 2022-08-10 ENCOUNTER — APPOINTMENT (OUTPATIENT)
Dept: ENDOCRINOLOGY | Facility: CLINIC | Age: 82
End: 2022-08-10

## 2022-09-06 ENCOUNTER — APPOINTMENT (OUTPATIENT)
Dept: INTERNAL MEDICINE | Facility: CLINIC | Age: 82
End: 2022-09-06

## 2022-09-06 VITALS
OXYGEN SATURATION: 99 % | WEIGHT: 155 LBS | SYSTOLIC BLOOD PRESSURE: 158 MMHG | BODY MASS INDEX: 25.02 KG/M2 | DIASTOLIC BLOOD PRESSURE: 70 MMHG | TEMPERATURE: 96.4 F | HEART RATE: 64 BPM

## 2022-09-06 DIAGNOSIS — N81.4 UTEROVAGINAL PROLAPSE, UNSPECIFIED: ICD-10-CM

## 2022-09-06 PROCEDURE — 99213 OFFICE O/P EST LOW 20 MIN: CPT | Mod: 25

## 2022-09-06 PROCEDURE — 36415 COLL VENOUS BLD VENIPUNCTURE: CPT

## 2022-09-06 RX ORDER — AMLODIPINE BESYLATE 5 MG/1
5 TABLET ORAL DAILY
Qty: 90 | Refills: 3 | Status: DISCONTINUED | COMMUNITY
End: 2022-09-06

## 2022-09-08 LAB
ALBUMIN SERPL ELPH-MCNC: 4.2 G/DL
ALP BLD-CCNC: 90 U/L
ALT SERPL-CCNC: 17 U/L
ANION GAP SERPL CALC-SCNC: 11 MMOL/L
AST SERPL-CCNC: 19 U/L
BASOPHILS # BLD AUTO: 0.05 K/UL
BASOPHILS NFR BLD AUTO: 0.7 %
BILIRUB SERPL-MCNC: 0.2 MG/DL
BUN SERPL-MCNC: 15 MG/DL
CALCIUM SERPL-MCNC: 10.2 MG/DL
CHLORIDE SERPL-SCNC: 98 MMOL/L
CO2 SERPL-SCNC: 29 MMOL/L
CREAT SERPL-MCNC: 1 MG/DL
EGFR: 57 ML/MIN/1.73M2
EOSINOPHIL # BLD AUTO: 0.19 K/UL
EOSINOPHIL NFR BLD AUTO: 2.7 %
ESTIMATED AVERAGE GLUCOSE: 298 MG/DL
GLUCOSE SERPL-MCNC: 262 MG/DL
HBA1C MFR BLD HPLC: 12 %
HCT VFR BLD CALC: 41.4 %
HGB BLD-MCNC: 12.7 G/DL
IMM GRANULOCYTES NFR BLD AUTO: 0.1 %
LYMPHOCYTES # BLD AUTO: 3.09 K/UL
LYMPHOCYTES NFR BLD AUTO: 43.7 %
MAN DIFF?: NORMAL
MCHC RBC-ENTMCNC: 27.1 PG
MCHC RBC-ENTMCNC: 30.7 GM/DL
MCV RBC AUTO: 88.5 FL
MONOCYTES # BLD AUTO: 0.6 K/UL
MONOCYTES NFR BLD AUTO: 8.5 %
NEUTROPHILS # BLD AUTO: 3.13 K/UL
NEUTROPHILS NFR BLD AUTO: 44.3 %
PLATELET # BLD AUTO: 335 K/UL
POTASSIUM SERPL-SCNC: 4.4 MMOL/L
PROT SERPL-MCNC: 8.1 G/DL
RBC # BLD: 4.68 M/UL
RBC # FLD: 13.8 %
SODIUM SERPL-SCNC: 138 MMOL/L
WBC # FLD AUTO: 7.07 K/UL

## 2022-09-08 NOTE — HISTORY OF PRESENT ILLNESS
[No Pertinent Cardiac History] : no history of aortic stenosis, atrial fibrillation, coronary artery disease, recent myocardial infarction, or implantable device/pacemaker [No Pertinent Pulmonary History] : no history of asthma, COPD, sleep apnea, or smoking [No Adverse Anesthesia Reaction] : no adverse anesthesia reaction in self or family member [Diabetes] : diabetes [(Patient denies any chest pain, claudication, dyspnea on exertion, orthopnea, palpitations or syncope)] : Patient denies any chest pain, claudication, dyspnea on exertion, orthopnea, palpitations or syncope [Aortic Stenosis] : no aortic stenosis [Atrial Fibrillation] : no atrial fibrillation [Coronary Artery Disease] : no coronary artery disease [Recent Myocardial Infarction] : no recent myocardial infarction [Implantable Device/Pacemaker] : no implantable device/pacemaker [Asthma] : no asthma [COPD] : no COPD [Sleep Apnea] : no sleep apnea [Smoker] : not a smoker [Family Member] : no family member with adverse anesthesia reaction/sudden death [Chronic Anticoagulation] : no chronic anticoagulation [Chronic Kidney Disease] : no chronic kidney disease [FreeTextEntry1] : Hysterectomy [FreeTextEntry2] : 9/12/22 [FreeTextEntry3] : Dr.Richard Younger [FreeTextEntry4] : Pt reports feeling well today.\par Pt is under endodontologist and cardiologist care.

## 2022-09-08 NOTE — RESULTS/DATA
[] : results reviewed [de-identified] : wnl [de-identified] : Glucose 262,GFR 57 [de-identified] : HGB A1C-12

## 2022-09-08 NOTE — ASSESSMENT
[High Risk Surgery - Intraperitoneal, Intrathoracic or Supringuinal Vascular Procedures] : High Risk Surgery - Intraperitoneal, Intrathoracic or Supringuinal Vascular Procedures - No (0) [Ischemic Heart Disease] : Ischemic Heart Disease - No (0) [Congestive Heart Failure] : Congestive Heart Failure - No (0) [Prior Cerebrovascular Accident or TIA] : Prior Cerebrovascular Accident or TIA - No (0) [Insulin-dependent Diabetic (1 point)] : Insulin-dependent Diabetic - Yes (1) [Creatinine >= 2mg/dL (1 Point)] : Creatinine >= 2mg/dL - No (0) [1] : 1 , RCRI Class: II, Risk of Post-Op Cardiac Complications: 6.0%, 95% CI for Risk Estimate: 4.9% - 7.4% [Modify medications prior to procedure] : Modify medications prior to procedure [As per surgery] : as per surgery [FreeTextEntry7] : pt to follow endocrinologist recommendations

## 2022-09-22 ENCOUNTER — APPOINTMENT (OUTPATIENT)
Dept: INTERNAL MEDICINE | Facility: CLINIC | Age: 82
End: 2022-09-22

## 2022-09-22 VITALS
DIASTOLIC BLOOD PRESSURE: 80 MMHG | WEIGHT: 149 LBS | BODY MASS INDEX: 23.95 KG/M2 | SYSTOLIC BLOOD PRESSURE: 130 MMHG | HEART RATE: 64 BPM | OXYGEN SATURATION: 98 % | TEMPERATURE: 96 F | HEIGHT: 66 IN

## 2022-09-22 DIAGNOSIS — Z01.818 ENCOUNTER FOR OTHER PREPROCEDURAL EXAMINATION: ICD-10-CM

## 2022-09-22 PROCEDURE — 99214 OFFICE O/P EST MOD 30 MIN: CPT

## 2022-09-22 NOTE — PLAN
[FreeTextEntry1] : refused flu vaccine\par rec flu, COVID vac\par DM- add mealtime insulin.  pt to ck 2 hr postprandial glucose- if above 200, pt will call me. \par pt to be compliant w diet

## 2022-09-22 NOTE — PHYSICAL EXAM
[Right Foot Was Examined] : Right foot ~C was examined [Left Foot Was Examined] : left foot ~C was examined [] : normal [de-identified] : black colored macular rash below the knee [de-identified] : scaly rash [TWNoteComboBox5] : +2 [TWNoteComboBox6] : +2

## 2022-09-22 NOTE — HISTORY OF PRESENT ILLNESS
[FreeTextEntry1] : DM [de-identified] : vaccine- refused flu vac,  rec tdap , shingrix \par exercise- walking 20 min- 2-3 day/wk\par had hysterectomy\par reviewed 9/6/22 HA1c 12.0\par DM- pt is  compliant w  meds.  non compliance w diet- stopped eating candy, raisin bran, ice cream. rec to Endo.  .  stop glimepiride, chg metformin to januvia due to GI SE.  pt started taking januvia 1 wk ago.  pt had only  taken lantus\par fasting glucose 140-160\par HTN- reviewed 2/3/22 Cardio notes, EKG 2/13/20 .  reviewed 2/26/20 ECHO- dilated LA, mild TR, MR.  home /75.  - rpt ECHO rec and f/u in 6 mo\par Lipid-2/3/22 Cardio notes- chg to crestor- no myalgia\par murmur- reviewed 2/26/20- diastolic dysf, mild TR.  EF 55-60'\par thyroid nodule- rec US \par chol- started statin last visit

## 2022-10-19 ENCOUNTER — APPOINTMENT (OUTPATIENT)
Dept: INTERNAL MEDICINE | Facility: CLINIC | Age: 82
End: 2022-10-19

## 2022-10-19 VITALS
HEART RATE: 65 BPM | TEMPERATURE: 96.5 F | SYSTOLIC BLOOD PRESSURE: 160 MMHG | BODY MASS INDEX: 24.11 KG/M2 | OXYGEN SATURATION: 98 % | WEIGHT: 150 LBS | HEIGHT: 66 IN | DIASTOLIC BLOOD PRESSURE: 80 MMHG

## 2022-10-19 PROCEDURE — 99214 OFFICE O/P EST MOD 30 MIN: CPT

## 2022-10-19 NOTE — PHYSICAL EXAM
[Right Foot Was Examined] : Right foot ~C was examined [Left Foot Was Examined] : left foot ~C was examined [] : normal [de-identified] : black colored macular rash below the knee [de-identified] : scaly rash [TWNoteComboBox5] : +2 [TWNoteComboBox6] : +2

## 2022-10-19 NOTE — HISTORY OF PRESENT ILLNESS
[FreeTextEntry1] : DM [de-identified] : vaccine- refused flu vac,  rec tdap , shingrix \par exercise- walking 20 min- 2-3 day/wk\par had hysterectomy\par reviewed 9/6/22 HA1c 12.0\par DM- pt is  compliant w  meds.  compliance w diet- stopped eating candy, raisin bran, ice cream. rec to Endo.  .  last visit added mealtime insulin\par fasting glucose 110-120\par HTN- reviewed 2/3/22 Cardio notes, EKG 2/13/20 .  reviewed 2/26/20 ECHO- dilated LA, mild TR, MR.  home /75.  - rpt ECHO rec and f/u in 6 mo.  pt states she took her meds\par Lipid-2/3/22 Cardio notes- chg to crestor- no myalgia\par murmur- rec ECHO\par thyroid nodule- rec US \par

## 2022-11-23 ENCOUNTER — APPOINTMENT (OUTPATIENT)
Dept: ENDOCRINOLOGY | Facility: CLINIC | Age: 82
End: 2022-11-23

## 2022-11-23 NOTE — HISTORY OF PRESENT ILLNESS
[FreeTextEntry1] : \par Ms. VADIM STRICKLAND is a 81 year  year old female who presents for initial endocrine evaluation.  Patient presents with regard to a history of  DM2 and Thyroid Nodule.\par \par In regards to her Diabetes:\par The diabetes has been present for  . There is no known history of nephropathy or retinopathy. With regard to neuropathy,   . He does deny any podiatric concerns and too denies the presence of any cuts or breaks in the skin on the feet. Ophthalmologic exam is up to date without apparent retinal changes. \par \par Current medication for the diabetes includes Lantus 30 units at night, Januvia 100mg. \par \par In terms of home glucose testing, glucose values in the am have been and \par \par POCT A1C returned today at   previously A1C 10.4% 03/22/2022\par \par \par In regards to Thyroid Nodule: No US result  noted to date in the system. rx noted for US to be done. \par \par Additional medical history includes that of  hyperlipidemia and hypertension.\par \par Meds:\par \par \par Family History:\par \par Medications include: Amlodipine 5mg, Lisinopril 20mg, Metoprolol 50mg, Rosuvastatin 20mg \par \par Hospitalizations:\par \par Surgeries:\par \par

## 2022-12-02 ENCOUNTER — APPOINTMENT (OUTPATIENT)
Dept: INTERNAL MEDICINE | Facility: CLINIC | Age: 82
End: 2022-12-02

## 2022-12-02 LAB
ALBUMIN SERPL ELPH-MCNC: 4.5 G/DL
ALP BLD-CCNC: 77 U/L
ALT SERPL-CCNC: 13 U/L
ANION GAP SERPL CALC-SCNC: 10 MMOL/L
AST SERPL-CCNC: 19 U/L
BILIRUB SERPL-MCNC: 0.4 MG/DL
BUN SERPL-MCNC: 13 MG/DL
CALCIUM SERPL-MCNC: 10.2 MG/DL
CHLORIDE SERPL-SCNC: 101 MMOL/L
CHOLEST SERPL-MCNC: 264 MG/DL
CO2 SERPL-SCNC: 30 MMOL/L
CREAT SERPL-MCNC: 1.01 MG/DL
EGFR: 57 ML/MIN/1.73M2
GLUCOSE SERPL-MCNC: 181 MG/DL
HDLC SERPL-MCNC: 52 MG/DL
LDLC SERPL CALC-MCNC: 180 MG/DL
NONHDLC SERPL-MCNC: 212 MG/DL
POTASSIUM SERPL-SCNC: 4.8 MMOL/L
PROT SERPL-MCNC: 7.9 G/DL
SODIUM SERPL-SCNC: 140 MMOL/L
TRIGL SERPL-MCNC: 160 MG/DL

## 2022-12-02 PROCEDURE — 36415 COLL VENOUS BLD VENIPUNCTURE: CPT

## 2022-12-05 LAB
ESTIMATED AVERAGE GLUCOSE: 192 MG/DL
HBA1C MFR BLD HPLC: 8.3 %

## 2022-12-09 ENCOUNTER — APPOINTMENT (OUTPATIENT)
Dept: INTERNAL MEDICINE | Facility: CLINIC | Age: 82
End: 2022-12-09

## 2022-12-09 VITALS — DIASTOLIC BLOOD PRESSURE: 100 MMHG | SYSTOLIC BLOOD PRESSURE: 170 MMHG

## 2022-12-09 VITALS
OXYGEN SATURATION: 97 % | SYSTOLIC BLOOD PRESSURE: 190 MMHG | BODY MASS INDEX: 24.7 KG/M2 | WEIGHT: 153 LBS | HEART RATE: 68 BPM | DIASTOLIC BLOOD PRESSURE: 100 MMHG

## 2022-12-09 PROCEDURE — 99214 OFFICE O/P EST MOD 30 MIN: CPT

## 2022-12-09 RX ORDER — DOCUSATE SODIUM 100 MG/1
100 CAPSULE, LIQUID FILLED ORAL
Qty: 28 | Refills: 0 | Status: ACTIVE | COMMUNITY
Start: 2022-09-13

## 2022-12-09 RX ORDER — TERBINAFINE HYDROCHLORIDE 1 G/100G
1 CREAM TOPICAL
Qty: 1 | Refills: 0 | Status: ACTIVE | COMMUNITY
Start: 2022-12-09 | End: 1900-01-01

## 2022-12-09 NOTE — PLAN
[FreeTextEntry1] : next visit rec CPE\par lipid- restart statin.  pt will stop the creamer\par pt to release copy of colonoscopy\par HTN- add norvasc\par gait- rec walker-

## 2022-12-09 NOTE — PHYSICAL EXAM
[de-identified] : black colored macular rash below the knee [de-identified] : gait nl [de-identified] : scaly rash [TWNoteComboBox5] : +2 [TWNoteComboBox6] : +2

## 2022-12-09 NOTE — HISTORY OF PRESENT ILLNESS
[FreeTextEntry1] : HTN [de-identified] : rec colonoscopy, DEXA, ECHO, US thyroid.  pt states she had colonoscopy 2-3 yr ago [facility]\par reviewed 12/2/22 , A1c 8.3\par walking 1 mile- no CP or SOB\par pt was in the 11/25/22- missed a step and fell while going down the stairs. - CT head was nl as per pt.  now pt is doing well\par DM- pt is  compliant w  meds.  compliance w diet- stopped eating candy, raisin bran, ice cream. rec to Endo.  .  last visit added mealtime insulin\par fasting glucose 150's\par ophth-01/22\par HTN- reviewed 2/3/22 Cardio notes, EKG 2/13/20 .  reviewed 2/26/20 ECHO- dilated LA, mild TR, MR.  home /75.  - rpt ECHO rec and f/u in 6 mo.  pt states she took her meds\par Lipid-2/3/22 Cardio notes- chg to crestor- no myalgia.  uses creamer in hazelnut coffee.  pt has not taken crestor\par murmur- rec ECHO\par thyroid nodule- rec US \par

## 2022-12-12 LAB
CREAT SPEC-SCNC: 169 MG/DL
MICROALBUMIN 24H UR DL<=1MG/L-MCNC: 4.8 MG/DL
MICROALBUMIN/CREAT 24H UR-RTO: 29 MG/G

## 2023-01-23 ENCOUNTER — RX RENEWAL (OUTPATIENT)
Age: 83
End: 2023-01-23

## 2023-02-03 ENCOUNTER — INPATIENT (INPATIENT)
Facility: HOSPITAL | Age: 83
LOS: 2 days | Discharge: HOME HEALTH | DRG: 378 | End: 2023-02-06
Attending: STUDENT IN AN ORGANIZED HEALTH CARE EDUCATION/TRAINING PROGRAM | Admitting: STUDENT IN AN ORGANIZED HEALTH CARE EDUCATION/TRAINING PROGRAM
Payer: MEDICARE

## 2023-02-03 ENCOUNTER — APPOINTMENT (OUTPATIENT)
Dept: ENDOCRINOLOGY | Facility: CLINIC | Age: 83
End: 2023-02-03

## 2023-02-03 VITALS
OXYGEN SATURATION: 99 % | WEIGHT: 156.97 LBS | DIASTOLIC BLOOD PRESSURE: 89 MMHG | TEMPERATURE: 98 F | HEIGHT: 66 IN | HEART RATE: 77 BPM | SYSTOLIC BLOOD PRESSURE: 146 MMHG | RESPIRATION RATE: 16 BRPM

## 2023-02-03 DIAGNOSIS — I10 ESSENTIAL (PRIMARY) HYPERTENSION: ICD-10-CM

## 2023-02-03 DIAGNOSIS — E78.5 HYPERLIPIDEMIA, UNSPECIFIED: ICD-10-CM

## 2023-02-03 DIAGNOSIS — K59.00 CONSTIPATION, UNSPECIFIED: ICD-10-CM

## 2023-02-03 DIAGNOSIS — K62.5 HEMORRHAGE OF ANUS AND RECTUM: ICD-10-CM

## 2023-02-03 DIAGNOSIS — E11.9 TYPE 2 DIABETES MELLITUS WITHOUT COMPLICATIONS: ICD-10-CM

## 2023-02-03 DIAGNOSIS — Z29.9 ENCOUNTER FOR PROPHYLACTIC MEASURES, UNSPECIFIED: ICD-10-CM

## 2023-02-03 LAB
ALBUMIN SERPL ELPH-MCNC: 3.4 G/DL — SIGNIFICANT CHANGE UP (ref 3.4–5)
ALP SERPL-CCNC: 76 U/L — SIGNIFICANT CHANGE UP (ref 40–120)
ALT FLD-CCNC: 18 U/L — SIGNIFICANT CHANGE UP (ref 12–42)
ANION GAP SERPL CALC-SCNC: 10 MMOL/L — SIGNIFICANT CHANGE UP (ref 9–16)
APPEARANCE UR: CLEAR — SIGNIFICANT CHANGE UP
APTT BLD: 28.4 SEC — SIGNIFICANT CHANGE UP (ref 27.5–35.5)
APTT BLD: 28.9 SEC — SIGNIFICANT CHANGE UP (ref 27.5–35.5)
AST SERPL-CCNC: 15 U/L — SIGNIFICANT CHANGE UP (ref 15–37)
BACTERIA # UR AUTO: PRESENT /HPF
BASOPHILS # BLD AUTO: 0.04 K/UL — SIGNIFICANT CHANGE UP (ref 0–0.2)
BASOPHILS # BLD AUTO: 0.04 K/UL — SIGNIFICANT CHANGE UP (ref 0–0.2)
BASOPHILS # BLD AUTO: 0.06 K/UL — SIGNIFICANT CHANGE UP (ref 0–0.2)
BASOPHILS NFR BLD AUTO: 0.4 % — SIGNIFICANT CHANGE UP (ref 0–2)
BASOPHILS NFR BLD AUTO: 0.5 % — SIGNIFICANT CHANGE UP (ref 0–2)
BASOPHILS NFR BLD AUTO: 0.8 % — SIGNIFICANT CHANGE UP (ref 0–2)
BILIRUB SERPL-MCNC: 0.2 MG/DL — SIGNIFICANT CHANGE UP (ref 0.2–1.2)
BILIRUB UR-MCNC: NEGATIVE — SIGNIFICANT CHANGE UP
BLD GP AB SCN SERPL QL: NEGATIVE — SIGNIFICANT CHANGE UP
BUN SERPL-MCNC: 13 MG/DL — SIGNIFICANT CHANGE UP (ref 7–23)
CALCIUM SERPL-MCNC: 8.9 MG/DL — SIGNIFICANT CHANGE UP (ref 8.5–10.5)
CHLORIDE SERPL-SCNC: 102 MMOL/L — SIGNIFICANT CHANGE UP (ref 96–108)
CK SERPL-CCNC: 122 U/L — SIGNIFICANT CHANGE UP (ref 26–192)
CO2 SERPL-SCNC: 28 MMOL/L — SIGNIFICANT CHANGE UP (ref 22–31)
COLOR SPEC: YELLOW — SIGNIFICANT CHANGE UP
CREAT SERPL-MCNC: 1.1 MG/DL — SIGNIFICANT CHANGE UP (ref 0.5–1.3)
DIFF PNL FLD: ABNORMAL
EGFR: 51 ML/MIN/1.73M2 — LOW
EOSINOPHIL # BLD AUTO: 0.11 K/UL — SIGNIFICANT CHANGE UP (ref 0–0.5)
EOSINOPHIL # BLD AUTO: 0.11 K/UL — SIGNIFICANT CHANGE UP (ref 0–0.5)
EOSINOPHIL # BLD AUTO: 0.26 K/UL — SIGNIFICANT CHANGE UP (ref 0–0.5)
EOSINOPHIL NFR BLD AUTO: 1.2 % — SIGNIFICANT CHANGE UP (ref 0–6)
EOSINOPHIL NFR BLD AUTO: 1.4 % — SIGNIFICANT CHANGE UP (ref 0–6)
EOSINOPHIL NFR BLD AUTO: 3.4 % — SIGNIFICANT CHANGE UP (ref 0–6)
EPI CELLS # UR: ABNORMAL /HPF (ref 0–5)
FERRITIN SERPL-MCNC: 145 NG/ML — SIGNIFICANT CHANGE UP (ref 15–150)
GLUCOSE BLDC GLUCOMTR-MCNC: 112 MG/DL — HIGH (ref 70–99)
GLUCOSE BLDC GLUCOMTR-MCNC: 169 MG/DL — HIGH (ref 70–99)
GLUCOSE BLDC GLUCOMTR-MCNC: 179 MG/DL — HIGH (ref 70–99)
GLUCOSE BLDC GLUCOMTR-MCNC: 194 MG/DL — HIGH (ref 70–99)
GLUCOSE SERPL-MCNC: 240 MG/DL — HIGH (ref 70–99)
GLUCOSE UR QL: NEGATIVE — SIGNIFICANT CHANGE UP
HCT VFR BLD CALC: 30.2 % — LOW (ref 34.5–45)
HCT VFR BLD CALC: 33.2 % — LOW (ref 34.5–45)
HCT VFR BLD CALC: 36.2 % — SIGNIFICANT CHANGE UP (ref 34.5–45)
HCT VFR BLD CALC: 37.6 % — SIGNIFICANT CHANGE UP (ref 34.5–45)
HGB BLD-MCNC: 10.4 G/DL — LOW (ref 11.5–15.5)
HGB BLD-MCNC: 11.4 G/DL — LOW (ref 11.5–15.5)
HGB BLD-MCNC: 11.8 G/DL — SIGNIFICANT CHANGE UP (ref 11.5–15.5)
HGB BLD-MCNC: 9.9 G/DL — LOW (ref 11.5–15.5)
IMM GRANULOCYTES NFR BLD AUTO: 0.1 % — SIGNIFICANT CHANGE UP (ref 0–0.9)
IMM GRANULOCYTES NFR BLD AUTO: 0.3 % — SIGNIFICANT CHANGE UP (ref 0–0.9)
IMM GRANULOCYTES NFR BLD AUTO: 0.3 % — SIGNIFICANT CHANGE UP (ref 0–0.9)
INR BLD: 1.04 — SIGNIFICANT CHANGE UP (ref 0.88–1.16)
INR BLD: 1.11 — SIGNIFICANT CHANGE UP (ref 0.88–1.16)
IRON SATN MFR SERPL: 57 UG/DL — SIGNIFICANT CHANGE UP (ref 30–160)
IRON SATN MFR SERPL: SIGNIFICANT CHANGE UP % (ref 14–50)
KETONES UR-MCNC: NEGATIVE — SIGNIFICANT CHANGE UP
LACTATE SERPL-SCNC: 2.2 MMOL/L — HIGH (ref 0.5–2)
LACTATE SERPL-SCNC: 2.5 MMOL/L — HIGH (ref 0.4–2)
LACTATE SERPL-SCNC: 2.9 MMOL/L — HIGH (ref 0.4–2)
LEUKOCYTE ESTERASE UR-ACNC: NEGATIVE — SIGNIFICANT CHANGE UP
LG PLATELETS BLD QL AUTO: SLIGHT — SIGNIFICANT CHANGE UP
LYMPHOCYTES # BLD AUTO: 2.15 K/UL — SIGNIFICANT CHANGE UP (ref 1–3.3)
LYMPHOCYTES # BLD AUTO: 2.53 K/UL — SIGNIFICANT CHANGE UP (ref 1–3.3)
LYMPHOCYTES # BLD AUTO: 2.93 K/UL — SIGNIFICANT CHANGE UP (ref 1–3.3)
LYMPHOCYTES # BLD AUTO: 23.9 % — SIGNIFICANT CHANGE UP (ref 13–44)
LYMPHOCYTES # BLD AUTO: 32.9 % — SIGNIFICANT CHANGE UP (ref 13–44)
LYMPHOCYTES # BLD AUTO: 38.9 % — SIGNIFICANT CHANGE UP (ref 13–44)
MANUAL SMEAR VERIFICATION: SIGNIFICANT CHANGE UP
MCHC RBC-ENTMCNC: 27.9 PG — SIGNIFICANT CHANGE UP (ref 27–34)
MCHC RBC-ENTMCNC: 28 PG — SIGNIFICANT CHANGE UP (ref 27–34)
MCHC RBC-ENTMCNC: 28.2 PG — SIGNIFICANT CHANGE UP (ref 27–34)
MCHC RBC-ENTMCNC: 28.5 PG — SIGNIFICANT CHANGE UP (ref 27–34)
MCHC RBC-ENTMCNC: 31.3 GM/DL — LOW (ref 32–36)
MCHC RBC-ENTMCNC: 31.4 GM/DL — LOW (ref 32–36)
MCHC RBC-ENTMCNC: 31.5 GM/DL — LOW (ref 32–36)
MCHC RBC-ENTMCNC: 32.8 GM/DL — SIGNIFICANT CHANGE UP (ref 32–36)
MCV RBC AUTO: 87 FL — SIGNIFICANT CHANGE UP (ref 80–100)
MCV RBC AUTO: 89 FL — SIGNIFICANT CHANGE UP (ref 80–100)
MCV RBC AUTO: 89.1 FL — SIGNIFICANT CHANGE UP (ref 80–100)
MCV RBC AUTO: 89.6 FL — SIGNIFICANT CHANGE UP (ref 80–100)
MICROCYTES BLD QL: SLIGHT — SIGNIFICANT CHANGE UP
MONOCYTES # BLD AUTO: 0.54 K/UL — SIGNIFICANT CHANGE UP (ref 0–0.9)
MONOCYTES # BLD AUTO: 0.56 K/UL — SIGNIFICANT CHANGE UP (ref 0–0.9)
MONOCYTES # BLD AUTO: 0.64 K/UL — SIGNIFICANT CHANGE UP (ref 0–0.9)
MONOCYTES NFR BLD AUTO: 6.2 % — SIGNIFICANT CHANGE UP (ref 2–14)
MONOCYTES NFR BLD AUTO: 7 % — SIGNIFICANT CHANGE UP (ref 2–14)
MONOCYTES NFR BLD AUTO: 8.5 % — SIGNIFICANT CHANGE UP (ref 2–14)
NEUTROPHILS # BLD AUTO: 3.64 K/UL — SIGNIFICANT CHANGE UP (ref 1.8–7.4)
NEUTROPHILS # BLD AUTO: 4.44 K/UL — SIGNIFICANT CHANGE UP (ref 1.8–7.4)
NEUTROPHILS # BLD AUTO: 6.11 K/UL — SIGNIFICANT CHANGE UP (ref 1.8–7.4)
NEUTROPHILS NFR BLD AUTO: 48.3 % — SIGNIFICANT CHANGE UP (ref 43–77)
NEUTROPHILS NFR BLD AUTO: 57.9 % — SIGNIFICANT CHANGE UP (ref 43–77)
NEUTROPHILS NFR BLD AUTO: 68 % — SIGNIFICANT CHANGE UP (ref 43–77)
NITRITE UR-MCNC: NEGATIVE — SIGNIFICANT CHANGE UP
NRBC # BLD: 0 /100 WBCS — SIGNIFICANT CHANGE UP (ref 0–0)
NT-PROBNP SERPL-SCNC: 83 PG/ML — SIGNIFICANT CHANGE UP
OB PNL STL: POSITIVE
PH UR: 7.5 — SIGNIFICANT CHANGE UP (ref 5–8)
PLAT MORPH BLD: ABNORMAL
PLATELET # BLD AUTO: 139 K/UL — LOW (ref 150–400)
PLATELET # BLD AUTO: 220 K/UL — SIGNIFICANT CHANGE UP (ref 150–400)
PLATELET # BLD AUTO: 225 K/UL — SIGNIFICANT CHANGE UP (ref 150–400)
PLATELET # BLD AUTO: 230 K/UL — SIGNIFICANT CHANGE UP (ref 150–400)
POTASSIUM SERPL-MCNC: 4.2 MMOL/L — SIGNIFICANT CHANGE UP (ref 3.5–5.3)
POTASSIUM SERPL-SCNC: 4.2 MMOL/L — SIGNIFICANT CHANGE UP (ref 3.5–5.3)
PROT SERPL-MCNC: 7.4 G/DL — SIGNIFICANT CHANGE UP (ref 6.4–8.2)
PROT UR-MCNC: NEGATIVE MG/DL — SIGNIFICANT CHANGE UP
PROTHROM AB SERPL-ACNC: 12.1 SEC — SIGNIFICANT CHANGE UP (ref 10.5–13.4)
PROTHROM AB SERPL-ACNC: 13.2 SEC — SIGNIFICANT CHANGE UP (ref 10.5–13.4)
RBC # BLD: 3.47 M/UL — LOW (ref 3.8–5.2)
RBC # BLD: 3.5 M/UL — LOW (ref 3.8–5.2)
RBC # BLD: 3.73 M/UL — LOW (ref 3.8–5.2)
RBC # BLD: 4.04 M/UL — SIGNIFICANT CHANGE UP (ref 3.8–5.2)
RBC # BLD: 4.22 M/UL — SIGNIFICANT CHANGE UP (ref 3.8–5.2)
RBC # FLD: 14.1 % — SIGNIFICANT CHANGE UP (ref 10.3–14.5)
RBC # FLD: 14.1 % — SIGNIFICANT CHANGE UP (ref 10.3–14.5)
RBC # FLD: 14.2 % — SIGNIFICANT CHANGE UP (ref 10.3–14.5)
RBC # FLD: 14.2 % — SIGNIFICANT CHANGE UP (ref 10.3–14.5)
RBC BLD AUTO: ABNORMAL
RBC CASTS # UR COMP ASSIST: ABNORMAL /HPF
RETICS #: 43.1 K/UL — SIGNIFICANT CHANGE UP (ref 25–125)
RETICS/RBC NFR: 1.2 % — SIGNIFICANT CHANGE UP (ref 0.5–2.5)
RH IG SCN BLD-IMP: POSITIVE — SIGNIFICANT CHANGE UP
SARS-COV-2 RNA SPEC QL NAA+PROBE: SIGNIFICANT CHANGE UP
SODIUM SERPL-SCNC: 140 MMOL/L — SIGNIFICANT CHANGE UP (ref 132–145)
SP GR SPEC: <=1.005 — SIGNIFICANT CHANGE UP (ref 1–1.03)
TIBC SERPL-MCNC: SIGNIFICANT CHANGE UP UG/DL (ref 220–430)
TRANSFERRIN SERPL-MCNC: 170 MG/DL — LOW (ref 200–360)
TROPONIN I, HIGH SENSITIVITY RESULT: 8.2 NG/L — SIGNIFICANT CHANGE UP
TSH SERPL-MCNC: 1.25 UIU/ML — SIGNIFICANT CHANGE UP (ref 0.27–4.2)
UIBC SERPL-MCNC: SIGNIFICANT CHANGE UP UG/DL (ref 110–370)
UROBILINOGEN FLD QL: 0.2 E.U./DL — SIGNIFICANT CHANGE UP
WBC # BLD: 7.46 K/UL — SIGNIFICANT CHANGE UP (ref 3.8–10.5)
WBC # BLD: 7.54 K/UL — SIGNIFICANT CHANGE UP (ref 3.8–10.5)
WBC # BLD: 7.68 K/UL — SIGNIFICANT CHANGE UP (ref 3.8–10.5)
WBC # BLD: 9 K/UL — SIGNIFICANT CHANGE UP (ref 3.8–10.5)
WBC # FLD AUTO: 7.46 K/UL — SIGNIFICANT CHANGE UP (ref 3.8–10.5)
WBC # FLD AUTO: 7.54 K/UL — SIGNIFICANT CHANGE UP (ref 3.8–10.5)
WBC # FLD AUTO: 7.68 K/UL — SIGNIFICANT CHANGE UP (ref 3.8–10.5)
WBC # FLD AUTO: 9 K/UL — SIGNIFICANT CHANGE UP (ref 3.8–10.5)
WBC UR QL: < 5 /HPF — SIGNIFICANT CHANGE UP

## 2023-02-03 PROCEDURE — 99223 1ST HOSP IP/OBS HIGH 75: CPT

## 2023-02-03 PROCEDURE — 99291 CRITICAL CARE FIRST HOUR: CPT

## 2023-02-03 PROCEDURE — 71045 X-RAY EXAM CHEST 1 VIEW: CPT | Mod: 26

## 2023-02-03 PROCEDURE — 99222 1ST HOSP IP/OBS MODERATE 55: CPT

## 2023-02-03 PROCEDURE — 74174 CTA ABD&PLVS W/CONTRAST: CPT | Mod: 26

## 2023-02-03 PROCEDURE — 99291 CRITICAL CARE FIRST HOUR: CPT | Mod: GC

## 2023-02-03 RX ORDER — SODIUM CHLORIDE 9 MG/ML
1000 INJECTION INTRAMUSCULAR; INTRAVENOUS; SUBCUTANEOUS ONCE
Refills: 0 | Status: COMPLETED | OUTPATIENT
Start: 2023-02-03 | End: 2023-02-03

## 2023-02-03 RX ORDER — DEXTROSE 50 % IN WATER 50 %
12.5 SYRINGE (ML) INTRAVENOUS ONCE
Refills: 0 | Status: DISCONTINUED | OUTPATIENT
Start: 2023-02-03 | End: 2023-02-06

## 2023-02-03 RX ORDER — DEXTROSE 50 % IN WATER 50 %
15 SYRINGE (ML) INTRAVENOUS ONCE
Refills: 0 | Status: DISCONTINUED | OUTPATIENT
Start: 2023-02-03 | End: 2023-02-06

## 2023-02-03 RX ORDER — DEXTROSE 50 % IN WATER 50 %
25 SYRINGE (ML) INTRAVENOUS ONCE
Refills: 0 | Status: DISCONTINUED | OUTPATIENT
Start: 2023-02-03 | End: 2023-02-06

## 2023-02-03 RX ORDER — HYDRALAZINE HCL 50 MG
5 TABLET ORAL ONCE
Refills: 0 | Status: COMPLETED | OUTPATIENT
Start: 2023-02-03 | End: 2023-02-03

## 2023-02-03 RX ORDER — LISINOPRIL 2.5 MG/1
10 TABLET ORAL DAILY
Refills: 0 | Status: DISCONTINUED | OUTPATIENT
Start: 2023-02-03 | End: 2023-02-03

## 2023-02-03 RX ORDER — INSULIN LISPRO 100/ML
VIAL (ML) SUBCUTANEOUS EVERY 6 HOURS
Refills: 0 | Status: DISCONTINUED | OUTPATIENT
Start: 2023-02-03 | End: 2023-02-03

## 2023-02-03 RX ORDER — METOPROLOL TARTRATE 50 MG
50 TABLET ORAL DAILY
Refills: 0 | Status: DISCONTINUED | OUTPATIENT
Start: 2023-02-03 | End: 2023-02-06

## 2023-02-03 RX ORDER — INSULIN LISPRO 100/ML
VIAL (ML) SUBCUTANEOUS
Refills: 0 | Status: DISCONTINUED | OUTPATIENT
Start: 2023-02-03 | End: 2023-02-06

## 2023-02-03 RX ORDER — PANTOPRAZOLE SODIUM 20 MG/1
40 TABLET, DELAYED RELEASE ORAL EVERY 12 HOURS
Refills: 0 | Status: DISCONTINUED | OUTPATIENT
Start: 2023-02-03 | End: 2023-02-06

## 2023-02-03 RX ORDER — INSULIN GLARGINE 100 [IU]/ML
15 INJECTION, SOLUTION SUBCUTANEOUS ONCE
Refills: 0 | Status: DISCONTINUED | OUTPATIENT
Start: 2023-02-03 | End: 2023-02-03

## 2023-02-03 RX ORDER — SITAGLIPTIN 50 MG/1
1 TABLET, FILM COATED ORAL
Qty: 0 | Refills: 0 | DISCHARGE

## 2023-02-03 RX ORDER — PANTOPRAZOLE SODIUM 20 MG/1
80 TABLET, DELAYED RELEASE ORAL ONCE
Refills: 0 | Status: COMPLETED | OUTPATIENT
Start: 2023-02-03 | End: 2023-02-03

## 2023-02-03 RX ORDER — GLUCAGON INJECTION, SOLUTION 0.5 MG/.1ML
1 INJECTION, SOLUTION SUBCUTANEOUS ONCE
Refills: 0 | Status: DISCONTINUED | OUTPATIENT
Start: 2023-02-03 | End: 2023-02-06

## 2023-02-03 RX ORDER — METOPROLOL TARTRATE 50 MG
1 TABLET ORAL
Qty: 0 | Refills: 0 | DISCHARGE

## 2023-02-03 RX ORDER — INSULIN GLARGINE 100 [IU]/ML
30 INJECTION, SOLUTION SUBCUTANEOUS
Qty: 0 | Refills: 0 | DISCHARGE

## 2023-02-03 RX ORDER — LISINOPRIL 2.5 MG/1
20 TABLET ORAL DAILY
Refills: 0 | Status: DISCONTINUED | OUTPATIENT
Start: 2023-02-04 | End: 2023-02-05

## 2023-02-03 RX ORDER — SODIUM CHLORIDE 9 MG/ML
1000 INJECTION, SOLUTION INTRAVENOUS
Refills: 0 | Status: DISCONTINUED | OUTPATIENT
Start: 2023-02-03 | End: 2023-02-06

## 2023-02-03 RX ORDER — LISINOPRIL 2.5 MG/1
10 TABLET ORAL ONCE
Refills: 0 | Status: COMPLETED | OUTPATIENT
Start: 2023-02-03 | End: 2023-02-03

## 2023-02-03 RX ORDER — PANTOPRAZOLE SODIUM 20 MG/1
8 TABLET, DELAYED RELEASE ORAL
Qty: 80 | Refills: 0 | Status: DISCONTINUED | OUTPATIENT
Start: 2023-02-03 | End: 2023-02-03

## 2023-02-03 RX ADMIN — Medication 5 MILLIGRAM(S): at 22:32

## 2023-02-03 RX ADMIN — LISINOPRIL 10 MILLIGRAM(S): 2.5 TABLET ORAL at 14:15

## 2023-02-03 RX ADMIN — Medication 50 MILLIGRAM(S): at 18:57

## 2023-02-03 RX ADMIN — PANTOPRAZOLE SODIUM 80 MILLIGRAM(S): 20 TABLET, DELAYED RELEASE ORAL at 02:35

## 2023-02-03 RX ADMIN — PANTOPRAZOLE SODIUM 10 MG/HR: 20 TABLET, DELAYED RELEASE ORAL at 02:45

## 2023-02-03 RX ADMIN — Medication 5 MILLIGRAM(S): at 14:48

## 2023-02-03 RX ADMIN — SODIUM CHLORIDE 1000 MILLILITER(S): 9 INJECTION INTRAMUSCULAR; INTRAVENOUS; SUBCUTANEOUS at 05:39

## 2023-02-03 RX ADMIN — LISINOPRIL 10 MILLIGRAM(S): 2.5 TABLET ORAL at 14:48

## 2023-02-03 RX ADMIN — PANTOPRAZOLE SODIUM 10 MG/HR: 20 TABLET, DELAYED RELEASE ORAL at 14:15

## 2023-02-03 RX ADMIN — Medication 2: at 22:33

## 2023-02-03 RX ADMIN — PANTOPRAZOLE SODIUM 40 MILLIGRAM(S): 20 TABLET, DELAYED RELEASE ORAL at 18:56

## 2023-02-03 RX ADMIN — SODIUM CHLORIDE 1000 MILLILITER(S): 9 INJECTION INTRAMUSCULAR; INTRAVENOUS; SUBCUTANEOUS at 03:58

## 2023-02-03 NOTE — H&P ADULT - ASSESSMENT
82-year-old Orthodoxy female with PMHx of HTH, HLD, T2DM, prolapsed uterus s/p elective hystrectomy (09/12/2022, [facility]), L carotid bruit, thyroid nodule, tinea pedia, gestational hemorrhoids, chronic constipation and cardiac arrhythmia (irregular) is consulted to General Surgery for evaluation of bleeding per rectum.   82-year-old Anglican female with PMHx of HTH, HLD, T2DM, prolapsed uterus s/p elective hystrectomy (09/12/2022, [facility]), L carotid bruit, thyroid nodule, gestational internal hemorrhoids, chronic constipation and prior cardiac arrhythmia (irregular) not on AC, presented with BRBPR and syncopal event, admitted to medicine for further workup.

## 2023-02-03 NOTE — ED ADULT NURSE REASSESSMENT NOTE - NS ED NURSE REASSESS COMMENT FT1
Assumed care from WILLIAM Martin @ 4900, pt a+ox4 in NAD. Urine sent and IVF administered per order. Pt awaiting admission to [facility].

## 2023-02-03 NOTE — ED PROVIDER NOTE - CLINICAL SUMMARY MEDICAL DECISION MAKING FREE TEXT BOX
Patient with history of hypertension, hyperlipidemia, diabetic, not on anticoagulants, history of hysterectomy in September 2022 at [facility] for prolapsed uterus, who presents with active melena from [facility] train [facility].  Patient just arrived to [place] where she lives from a trip in [place] and was on a 12-hour train ride at which time she had an episode of bleeding rectal while on the train.  Patient arrives with active blood in the stretcher melena dark with some scant clot.  Patient was resuscitated with IV access, full labs type and screen lactate, will image abdomen and she is mildly tender in the left lower quadrant start Protonix drip and load, patient will require admission.  Patient is requesting admission to [place] and transfer to [place] where her doctor Dr. Azra Guzman is her primary.  We will attempt to reach out Dr. Guzman through the centralized transfer center.  Patient will likely require telemetry stepdown or ICU depending on hemoglobin.But otherwise at this time her heart rate and blood pressure are stable and does not require stat blood. Patient with history of hypertension, hyperlipidemia, diabetic, not on anticoagulants, history of hysterectomy in September 2022 at [facility] for prolapsed uterus, who presents with active melena from [facility] train [facility].  Patient just arrived to [place] where she lives from a trip in [place] and was on a 12-hour train ride at which time she had an episode of bleeding rectal while on the train.  Patient arrives with active blood in the stretcher melena dark with some scant clot.  Patient was resuscitated with IV access, full labs type and screen lactate, will image abdomen and she is mildly tender in the left lower quadrant start Protonix drip and load, patient will require admission.  Patient is requesting admission to [place] and transfer to [place] where her doctor Dr. Azra Guzman is her primary.  We will attempt to reach out Dr. Guzman through the centralized transfer center.  Patient will likely require telemetry stepdown or ICU depending on hemoglobin. But otherwise at this time her heart rate and blood pressure are stable and does not require stat blood.

## 2023-02-03 NOTE — H&P ADULT - PROBLEM SELECTOR PLAN 5
Takes Januvia 100mg qd and Lantus 30U once a day. Patient currently NPO.  - obtain A1C  - start low dose iSS  - monitor FS  - continue NPO for now given GI bleed

## 2023-02-03 NOTE — H&P ADULT - PROBLEM SELECTOR PLAN 6
Plan:  F: s/p 2L NS in the ED   E: replete K<4, Mg<2  N: regular  VTE Prophylaxis: None, i/s/o GIB  C: Full Code  D: 7La

## 2023-02-03 NOTE — CONSULT NOTE ADULT - TIME BILLING
evaluation and management of lower GI bleed, review of labs and imaging, discussion with consultants, documentation

## 2023-02-03 NOTE — ED PROVIDER NOTE - PROGRESS NOTE DETAILS
Orthostatic stable patient accepted to Dr. Shetty and Dr. Pina for telemetry stepdown.  Discussed case with Dr. Shetty and agrees that patient be admitted on telemetry stepdown pending reassessment and if decompensates will call back to step up to ICU.  At this time patient is aware she is going to [facility] she agrees with this plan Transfer is here to  patient clinically stable Orthostatic stable patient accepted to Dr. Shetty and Dr. Graf for telemetry stepdown.  Discussed case with Dr. Shetty and agrees that patient be admitted on telemetry stepdown pending reassessment and if decompensates will call back to step up to ICU.  At this time patient is aware she is going to [facility] she agrees with this plan

## 2023-02-03 NOTE — H&P ADULT - NSHPLABSRESULTS_GEN_ALL_CORE
LABS:                          10.4   7.46  )-----------( 230      ( 03 Feb 2023 12:38 )             33.2     02-03    140  |  102  |  13  ----------------------------<  240<H>  4.2   |  28  |  1.10    Ca    8.9      03 Feb 2023 01:42    TPro  7.4  /  Alb  3.4  /  TBili  0.2  /  DBili  x   /  AST  15  /  ALT  18  /  AlkPhos  76  02-03    LIVER FUNCTIONS - ( 03 Feb 2023 01:42 )  Alb: 3.4 g/dL / Pro: 7.4 g/dL / ALK PHOS: 76 U/L / ALT: 18 U/L / AST: 15 U/L / GGT: x           PT/INR - ( 03 Feb 2023 08:10 )   PT: 13.2 sec;   INR: 1.11          PTT - ( 03 Feb 2023 08:10 )  PTT:28.9 sec  Urinalysis Basic - ( 03 Feb 2023 02:19 )    Color: Yellow / Appearance: Clear / SG: <=1.005 / pH: x  Gluc: x / Ketone: NEGATIVE  / Bili: NEGATIVE / Urobili: 0.2 E.U./dL   Blood: x / Protein: NEGATIVE mg/dL / Nitrite: NEGATIVE   Leuk Esterase: NEGATIVE / RBC: 5-10 /HPF / WBC < 5 /HPF   Sq Epi: x / Non Sq Epi: 5-10 /HPF / Bacteria: Present /HPF    RADIOLOGY: Reviewed.

## 2023-02-03 NOTE — ED PROVIDER NOTE - OBJECTIVE STATEMENT
82-year-old female with a history of hypertension and hyperlipidemia history of prolapsed uterus with elective elective hysterectomy at [facility] in September 2022 history of non-insulin-dependent diabetic not on anticoagulants, who presents with bright red blood per rectum while on the [employer] train.  Patient recently arrived this morning after 12-hour train ride from [place].  She resides in [place] and was visiting in [place].  Notes that while on the train she had a feeling of moving her bowels and when she went to the bathroom she saw blood in the toilet.  She had 2 episodes like this on the train.  She denies chest pain, shortness of breath, palpitations, denies back pain, denies recent trauma.  Denies fever chills, denies history of anticoagulants or transfusions.  She notes some mild left lower quadrant discomfort in the abdomen.  She also notes that since her hysterectomy she has had trouble with constipation and has been using stool softeners.  Notes that her bowel movement was not hard and was in fact soft.  She notes that upon arriving to the emergency department she went to the restroom to clean up and she felt lightheaded.  But otherwise denies syncope, headaches, or severe dizziness. Denies history of smoking alcohol or drugs

## 2023-02-03 NOTE — CONSULT NOTE ADULT - ASSESSMENT
82-year-old Cheondoism female with PMHx of HTH, HLD, T2DM, prolapsed uterus s/p elective hystrectomy (09/12/2022, [facility]), L carotid bruit, thyroid nodule, tinea pedia, gestational hemorrhoids, chronic constipation and cardiac arrhythmia (irregular) is consulted to General Surgery for evaluation of bleeding per rectum.    Vitals:  T 97.8F, HR 84, /84, RR 18, O2S 97% on room air  Serum Labs:  Hgb 9.9 (08:00) from 11.4 (04:00), LA 2.9>2.5>2.2  CTA A/P:  Extensive sigmoid and ascending colon diverticulosis in the absence of diverticulitis. Negative for free air. Fat containing umbilical hernia. Positive for abdominal aorta atherosclerosis.      PLAN  - Agree with GI consult recommendations for colonoscopy  - Recommend CBC Q6H  - Recommend PPI if UGI suspected  - Surgery Team 4C will continue to follow  - Please page Team 4 at [phone] with any questions and/or clinical changes          
82-year-old female Rastafarian with PMHx of HTH, HLD, T2DM, prolapsed uterus s/p elective hysterectomy (09/12/2022, [facility]), chronic constipation who presented to the ED with rectal bleeding    #Rectal bleeding  #Anemia    Suspicious for diverticular bleed based on CT noting extensive diverticulosis in sigmoid/ascending colon. No active bleeding on imaging.   Hgb with slight uptrend from 9.9 this AM to 10.4 in afternoon. No further clinical bleeding    Recommendations:  -Pt declined blood transfusions as she is Jehova's witness  -Supportive care otherwise, monitor Hgb closely  -Clear liquids today. If no bleeding overnight, may advance diet  -Timing of colonoscopy pending Hgb trend and clinical bleeding.    We will continue to follow    Rehan Whitaker DO  Gastroenterology Fellow  Pager: [phone]

## 2023-02-03 NOTE — H&P ADULT - NSICDXPASTMEDICALHX_GEN_ALL_CORE_FT
PAST MEDICAL HISTORY:  Diabetes     Hypertension      PAST MEDICAL HISTORY:  Diabetes     HLD (hyperlipidemia)     Hypertension     Prolapsed uterus     Thyroid nodule     Type 2 diabetes mellitus      PAST MEDICAL HISTORY:  Diabetes     HLD (hyperlipidemia)     Hypertension     Preventive measure     Prolapsed uterus     Thyroid nodule     Type 2 diabetes mellitus

## 2023-02-03 NOTE — H&P ADULT - NSHPREVIEWOFSYSTEMS_GEN_ALL_CORE
Constitutional: (-) fever, (-) chills  Eyes/ENT: (-) dizziness  Cardiovascular: (-) chest pain, (-) palpitations  Respiratory: (-) cough, (-) shortness of breath  Gastrointestinal: (-) abdominal pain, (-) nausea, (-) vomiting, (-) diarrhea, (+) constipation  Neurological: (-) headache

## 2023-02-03 NOTE — H&P ADULT - ATTENDING COMMENTS
Agree likely diverticular bleed. Pt is a Jehovahs witness and will not accept any blood products. Will limit blood draws if no further bleeding and vital signs stable. Plans per GI. Will need colonoscopy. Surgery consult.

## 2023-02-03 NOTE — PATIENT PROFILE ADULT - FALL HARM RISK - HARM RISK INTERVENTIONS
Assistance with ambulation/Assistance OOB with selected safe patient handling equipment/Communicate Risk of Fall with Harm to all staff/Discuss with provider need for PT consult/Monitor gait and stability/Provide patient with walking aids - walker, cane, crutches/Reinforce activity limits and safety measures with patient and family/Tailored Fall Risk Interventions/Use of alarms - bed, chair and/or voice tab/Visual Cue: Yellow wristband and red socks/Bed in lowest position, wheels locked, appropriate side rails in place/Call bell, personal items and telephone in reach/Instruct patient to call for assistance before getting out of bed or chair/Non-slip footwear when patient is out of bed/[place] to call system/Physically safe environment - no spills, clutter or unnecessary equipment/Purposeful Proactive Rounding/Room/bathroom lighting operational, light cord in reach

## 2023-02-03 NOTE — H&P ADULT - PROBLEM SELECTOR PLAN 3
On admission, presented with BP. On home lisinopril 20mg qd.  - c/w home lisinopril 10mg qd On admission, presented with /89,. On home lisinopril 20mg qd, toprol 50mg qd  - hold home metoprolol succinate 50mg qd due to GI bleed  - c/w home lisinopril 10mg qd  - monitor blood pressure, hydralazine 5mg IVP PRN for sBP >170 persistently

## 2023-02-03 NOTE — H&P ADULT - PROBLEM SELECTOR PLAN 1
Reports rectal bleeding while traveling home on [employer]. Patient reports last colonoscopy was 3 years ago in which she underwent polypectomy and was advised they were benign. On admission, AGGIE with   - consult surgery for Reports rectal bleeding while traveling home on [employer]. Patient reports last colonoscopy was 3 years ago in which she underwent polypectomy and was advised they were benign. On admission, AGGIE with perianal depigmentation, negative for external hemorrhoids, soft bloody stool in rectal vault (maroon colored blood), loose rectal tone, no masses/hemorrhoids palpated.  - pantoprazole 8mg/hr gtt  - consult surgery for anoscopy  - consult GI for possible colonoscopy/endoscopy Reports rectal bleeding while traveling home on [employer]. Patient reports last colonoscopy was 3 years ago in which she underwent polypectomy and was advised they were benign. On admission, AGGIE reported to be negative for external hemorrhoids, soft maroon colored stool in rectal vault, no masses/hemorrhoids palpated.   - two large bore IVs  - CBCs BID  - pantoprazole 8mg/hr gtt  - consult surgery for anoscopy   - consult GI for possible colonoscopy/endoscopy

## 2023-02-03 NOTE — H&P ADULT - HISTORY OF PRESENT ILLNESS
82-year-old Shinto female with PMHx of HTH, HLD, T2DM, prolapsed uterus s/p elective hystrectomy (09/12/2022, [facility]), L carotid bruit, thyroid nodule, tinea pedia, gestational hemorrhoids, chronic constipation and cardiac arrhythmia (irregular) presented with complaints of bleeding per rectum.  While traveling on [employer] the patient report going to the restroom and seeing stool mixed with a moderate amount of blood of multiple colors (bright red and maroon).  She had 2 episodes total and the bleeding was not associated with any pain; however, she felt weak prompting her presentation to the ED.  She denies any nausea and vomiting.  Her last colonoscopy was 3 years ago in which she underwent polypectomy and was advised they were benign.  No additional colonoscopies scheduled due to her age per the GI physician.  She denies any family history of GI cancers, IBD, and blood coagulapathies. 82-year-old Quaker female with PMHx of HTH, HLD, T2DM, prolapsed uterus s/p elective hystrectomy (09/12/2022, [facility]), L carotid bruit, thyroid nodule, tinea pedia, gestational hemorrhoids, chronic constipation and cardiac arrhythmia (irregular) presented with complaints of bleeding per rectum.  While traveling on [employer] the patient report going to the restroom and seeing stool mixed with a moderate amount of blood of multiple colors (bright red and maroon).  She had 2 episodes total and the bleeding was not associated with any pain; however, she felt weak prompting her presentation to the ED.  She denies any nausea and vomiting.  Her last colonoscopy was 3 years ago in which she underwent polypectomy and was advised they were benign.  No additional colonoscopies scheduled due to her age per the GI physician.  She denies any family history of GI cancers, IBD, and blood coagulapathies. 82-year-old Zoroastrianism female with PMHx of HTH, HLD, T2DM, prolapsed uterus s/p elective hysterectomy (09/12/2022, [facility]), L carotid bruit, thyroid nodule, tinea pedia, gestational hemorrhoids, chronic constipation and cardiac arrhythmia (irregular) presented with complaints of bleeding per rectum.  While traveling on [employer] the patient report going to the restroom and seeing stool mixed with a moderate amount of blood of multiple colors (bright red and maroon).  She had 2 episodes total and the bleeding was not associated with any pain; however, she felt weak prompting her presentation to the ED.  She denies any nausea and vomiting.  Her last colonoscopy was 3 years ago in which she underwent polypectomy and was advised they were benign.  No additional colonoscopies scheduled due to her age per the GI.       In the ED,  VS: T  97.7, HR 77, BP  146/89, RR  16, SpO2  99% RA  Labs: WBC 9, Hgb 11.4 --> 9.9, plt 139, lactate 2.9 --> 2.5, TSH 1.2  Urine: LE negative, nitrites negative, RBC 5-10, epithelial cells 5-10  EKG: NSR 71, QTc 434  CXR: cardiomegaly, lungs and mediastinum appear normal  CT angio: extensive diverticulosis of the sigmoid and descending colon, no evidence of acute diverticulitis, no evidence of active GI bleeding, no evidence of mesenteric ischemia.  Orders: pantoprazole 80mg IVP --> 8mg gtt, NS 2L bolus total     HPI:    82-year-old Moravian female with PMHx of HTH, HLD, T2DM, prolapsed uterus s/p elective hysterectomy (09/12/2022, [facility]), L carotid bruit, thyroid nodule, tinea pedia, gestational hemorrhoids, chronic constipation and cardiac arrhythmia (irregular) who presented with complaints of bleeding per rectum.  While traveling on [employer] the patient reports going to the restroom and seeing stool mixed with a moderate amount of blood of multiple colors (bright red and maroon).  She had 2 episodes total and the bleeding was not associated with any pain; however, she felt weak prompting her presentation to the ED. Patient also reports collapsing on the train from standing position with prodromal symptoms of feeling sweaty, nauseous, and hot prior to event. Denies any head strike prior to collapse. She denies any nausea and vomiting.  Her last colonoscopy was 3 years ago in which she underwent polypectomy and was advised they were benign.  Patient reported no additional colonoscopies were scheduled due to her age.      VS: T  97.7, HR 77, BP  146/89, RR  16, SpO2  99% RA  Labs: WBC 9, Hgb 11.4 --> 9.9, plt 139, lactate 2.9 --> 2.5, TSH 1.2  Urine: LE negative, nitrites negative, RBC 5-10, epithelial cells 5-10  EKG: NSR 71, QTc 434  CXR: cardiomegaly, lungs and mediastinum appear normal  CT angio: extensive diverticulosis of the sigmoid and descending colon, no evidence of acute diverticulitis, no evidence of active GI bleeding  Orders: pantoprazole 80mg IVP --> 8mg gtt, NS 2L bolus total     HPI:    82-year-old Restorationism female with PMHx of HTH, HLD, T2DM, prolapsed uterus s/p elective hysterectomy (09/12/2022, [facility]), L carotid bruit, thyroid nodule, tinea pedia, gestational hemorrhoids, chronic constipation and cardiac arrhythmia (irregular) who presented with complaints of bleeding per rectum.  While traveling on [employer] the patient reports going to the restroom and seeing stool mixed with a moderate amount of blood of multiple colors (bright red and maroon).  She had 2 episodes total and the bleeding was not associated with any pain; however, she felt weak prompting her presentation to the ED. Patient also reports collapsing on the train from standing position with prodromal symptoms of feeling sweaty, nauseous, and hot prior to event. Denies any head strike prior to collapse. She denies any nausea and vomiting.  Patient reports last colonoscopy was 3 years ago in which she underwent polypectomy and was advised they were benign.  Patient reported no additional colonoscopies were scheduled due to her age.      VS: T  97.7, HR 77, BP  146/89, RR  16, SpO2  99% RA  Labs: WBC 9, Hgb 11.4 --> 9.9, plt 139, lactate 2.9 --> 2.5, TSH 1.2  Urine: LE negative, nitrites negative, RBC 5-10, epithelial cells 5-10  EKG: NSR 71, QTc 434  CXR: cardiomegaly, lungs and mediastinum appear normal  CT angio: extensive diverticulosis of the sigmoid and descending colon, no evidence of acute diverticulitis, no evidence of active GI bleeding  Orders: pantoprazole 80mg IVP --> 8mg gtt, NS 2L bolus total

## 2023-02-03 NOTE — ED PROVIDER NOTE - CRITICAL CARE ATTENDING CONTRIBUTION TO CARE
Patient with history of hypertension, hyperlipidemia, diabetic, not on anticoagulants, history of hysterectomy in September 2022 at [facility] for prolapsed uterus, who presents with active melena from [facility] train [facility].  Patient just arrived to [place] where she lives from a trip in [place] and was on a 12-hour train ride at which time she had an episode of bleeding rectal while on the train.  Patient arrives with active blood in the stretcher melena dark with some scant clot.  Patient was resuscitated with IV access, full labs type and screen lactate, will image abdomen and she is mildly tender in the left lower quadrant start Protonix drip and load, patient will require admission.  Patient is requesting admission to [place] and transfer to [place] where her doctor Dr. Azra Guzman is her primary.  We will attempt to reach out Dr. Guzman through the centralized transfer center.  Patient will likely require telemetry stepdown or ICU depending on hemoglobin.But otherwise at this time her heart rate and blood pressure are stable and does not require stat blood.

## 2023-02-03 NOTE — ED ADULT TRIAGE NOTE - CHIEF COMPLAINT QUOTE
BIBA from train station s/p episode of heavy rectal bleeding and +syncope   on arrival pt is awake, alert, states she "feels better" at this time  reports hx a-fib, "takes a beta blocker and 81mg aspirin" denies other anticoagulation. denies hx of rectal bleeding. denies pain.

## 2023-02-03 NOTE — CONSULT NOTE ADULT - SUBJECTIVE AND OBJECTIVE BOX
82-year-old female Cheondoism with PMHx of HTH, HLD, T2DM, prolapsed uterus s/p elective hysterectomy (09/12/2022, [facility]), chronic constipation who presented to the ED with rectal bleeding    Was traveling on [employer] back to [place] from [place] yesterday which is when she started seeing stool mixed with a moderate amount of blood of multiple colors (bright red and maroon).  She had 2 episodes total and the bleeding was not associated with any pain; however, she felt weak prompting her presentation to the ED.  She denies any nausea and vomiting.      Her last colonoscopy was 3 years ago in which she underwent polypectomy and was advised they were benign with no further f/u recommended based on age.    She denies any family history of GI cancers, IBD, and blood coagulapathies. (03 Feb 2023 12:59)    No prior GIB hx  Not taking anticoag/NSAID use    At bedside, pt feels well. vague L sided abd pain but otherwise denies n/v. No further rectal bleeding since admission.    Allergies    No Known Allergies    Intolerances      Home Medications:    MEDICATIONS:  MEDICATIONS  (STANDING):  dextrose 5%. 1000 milliLiter(s) (50 mL/Hr) IV Continuous <Continuous>  dextrose 5%. 1000 milliLiter(s) (100 mL/Hr) IV Continuous <Continuous>  dextrose 50% Injectable 25 Gram(s) IV Push once  dextrose 50% Injectable 12.5 Gram(s) IV Push once  dextrose 50% Injectable 25 Gram(s) IV Push once  glucagon  Injectable 1 milliGRAM(s) IntraMuscular once  insulin lispro (ADMELOG) corrective regimen sliding scale   SubCutaneous every 6 hours  lisinopril 10 milliGRAM(s) Oral daily  pantoprazole Infusion 8 mG/Hr (10 mL/Hr) IV Continuous <Continuous>    MEDICATIONS  (PRN):  dextrose Oral Gel 15 Gram(s) Oral once PRN Blood Glucose LESS THAN 70 milliGRAM(s)/deciliter    PAST MEDICAL & SURGICAL HISTORY:  Hypertension      Diabetes        FAMILY HISTORY:    SOCIAL HISTORY:  Former smoker  Denies ETOH    REVIEW OF SYSTEMS:  All other 10 review of systems is negative unless indicated above.    Vital Signs Last 24 Hrs  T(C): 36.6 (03 Feb 2023 09:59), Max: 36.7 (03 Feb 2023 05:04)  T(F): 97.8 (03 Feb 2023 09:59), Max: 98 (03 Feb 2023 05:04)  HR: 81 (03 Feb 2023 09:00) (75 - 84)  BP: 166/71 (03 Feb 2023 09:00) (146/89 - 198/85)  BP(mean): 102 (03 Feb 2023 09:00) (102 - 132)  RR: 20 (03 Feb 2023 09:00) (16 - 20)  SpO2: 97% (03 Feb 2023 09:00) (97% - 99%)    Parameters below as of 03 Feb 2023 09:00  Patient On (Oxygen Delivery Method): room air          PHYSICAL EXAM:    General: No acute distress  Eyes: Anicteric sclerae, moist conjunctivae  HENT: Moist mucous membranes  Neck: Trachea midline, supple  Lungs: Normal respiratory effort and no intercostal retractions  Cardiovascular: RRR  Abdomen: Soft, non-tender non-distended; No rebound or guarding  Extremities: Normal range of motion, No clubbing, cyanosis or edema  Neurological: Alert and oriented x3  Skin: Warm and dry. No obvious rash    LABS:                        10.4   7.46  )-----------( 230      ( 03 Feb 2023 12:38 )             33.2     02-03    140  |  102  |  13  ----------------------------<  240<H>  4.2   |  28  |  1.10    Ca    8.9      03 Feb 2023 01:42    TPro  7.4  /  Alb  3.4  /  TBili  0.2  /  DBili  x   /  AST  15  /  ALT  18  /  AlkPhos  76  02-03        PT/INR - ( 03 Feb 2023 08:10 )   PT: 13.2 sec;   INR: 1.11          PTT - ( 03 Feb 2023 08:10 )  PTT:28.9 sec    RADIOLOGY & ADDITIONAL STUDIES:       ACC: [id number] EXAM:  CT ANGIO ABD PELV (W)AW IC   ORDERED BY: MARLON STRAUSS     PROCEDURE DATE:  02/03/2023          INTERPRETATION:  Exam: CTA Abdomen and Pelvis Without And With Contrast  Exam date and time: 2/3/2023 4:35 AM  Age: 82 years old  Clinical indication: Rectal bleed, elev lactic, llq abd pain    TECHNIQUE:  Imaging protocol: Computed tomographic angiography of the abdomen and   pelvis  without and with contrast.  3D rendering (Not supervised by radiologist): MIP and/or 3D reconstructed  images were created by the technologist. 100 cc Omnipaque 350   administered intravenously. 0 cc discarded.    COMPARISON:  CR XR CHEST 2/3/2023 1:52 AM    FINDINGS:  Lungs: Atelectasis or scarring at the bilateral lung bases.    Aorta: Atherosclerotic disease of the abdominal aorta.  Celiac trunk and mesenteric arteries: No occlusion or significant   stenosis. Mild narrowing of the proximal SMA.  Renal arteries: No occlusion or significant stenosis.  Right iliac arteries: No occlusion or significant stenosis.  Left iliac arteries: No occlusion or significant stenosis.    Liver: No mass.  Gallbladder and bile ducts: Unremarkable. No calcified stones. No ductal  dilation.  Pancreas: Unremarkable. No mass. No ductal dilation.  Spleen: Unremarkable. No splenomegaly.  Adrenal glands: Unremarkable. No mass.  Kidneys and ureters: Parapelvic left renal cyst.  Stomach and bowel: Diverticulosis of the colon. No evidence of acute  diverticulitis.  Appendix: No evidence of appendicitis.  Intraperitoneal space: Unremarkable. No free air. No significant fluid  collection.  Lymph nodes: Unremarkable. No enlarged lymph nodes.    Urinary bladder: Unremarkable. No mass.  Reproductive: Status post hysterectomy.  Bones/joints: Multilevel degenerative disease and facet arthropathy.  Degenerative changes in the bilateral sacroiliac joints.  Soft tissues: Fat containing umbilical hernia. Punctate calcifications   left central breast.    IMPRESSION:  Extensive diverticulosis of the sigmoid and ascending colon. No evidence   of acute diverticulitis.  No evidence of active gastrointestinal   bleeding. No evidence of mesenteric ischemia.    --- End of Report ---            MADIHA URENA MD; Attending Radiologist  This document has been electronically signed. Feb  3 2023  8:34AM  
HPI: 82-year-old Taoism female with PMHx of HTH, HLD, T2DM, prolapsed uterus s/p elective hystrectomy (2022, [facility]), L carotid bruit, thyroid nodule, tinea pedia, gestational hemorrhoids, chronic constipation and cardiac arrhythmia (irregular) presented with complaints of bleeding per rectum.  While traveling on [employer] the patient report going to the restroom and seeing stool mixed with a moderate amount of blood of multiple colors (bright red and maroon).  She had 2 episodes total and the bleeding was not associated with any pain; however, she felt weak prompting her presentation to the ED.  She denies any nausea and vomiting.  Her last colonoscopy was 3 years ago in which she underwent polypectomy and was advised they were benign.  No additional colonoscopies scheduled due to her age per the GI physician.  She denies any family history of GI cancers, IBD, and blood coagulapathies.      Constitutional: (-) fever, (-) chills  Eyes/ENT: (-) dizziness  Cardiovascular: (-) chest pain, (-) palpitations  Respiratory: (-) cough, (-) shortness of breath  Gastrointestinal: (-) abdominal pain, (-) nausea, (-) vomiting, (-) diarrhea, (+) constipation  Neurological: (-) headache      PAST MEDICAL HISTORY:  - Cardiac arrhythmia  - Chronic Constipation  - Gestational Hemorrhoids  - Hyperlipidemia  - Hypertension  - Prolapsed uterus  - Thyroid nodule  - Tinea Pedis  - Type 2 Diabetes Mellitus      PAST SURGICAL HISTORY:  - Hysterectomy (2022)      MEDICATIONS (HOME):        MEDICATIONS  (STANDING):  dextrose 5%. 1000 milliLiter(s) (50 mL/Hr) IV Continuous <Continuous>  dextrose 5%. 1000 milliLiter(s) (100 mL/Hr) IV Continuous <Continuous>  dextrose 50% Injectable 25 Gram(s) IV Push once  dextrose 50% Injectable 12.5 Gram(s) IV Push once  dextrose 50% Injectable 25 Gram(s) IV Push once  glucagon  Injectable 1 milliGRAM(s) IntraMuscular once  insulin lispro (ADMELOG) corrective regimen sliding scale   SubCutaneous every 6 hours  lisinopril 10 milliGRAM(s) Oral daily  pantoprazole Infusion 8 mG/Hr (10 mL/Hr) IV Continuous <Continuous>    MEDICATIONS  (PRN):  dextrose Oral Gel 15 Gram(s) Oral once PRN Blood Glucose LESS THAN 70 milliGRAM(s)/deciliter    Allergies:  No Known Allergies    Intolerances:  None      FAMILY HISTORY:  - Sister (x3, 2 ):  Breast Cancer    T(C): 36.6 (23 @ 09:59), Max: 36.7 (23 @ 05:04)  HR: 84 (23 @ 07:53) (75 - 84)  BP: 190/84 (23 @ 07:53) (146/89 - 198/85)  RR: 18 (23 @ 07:53) (16 - 20)  SpO2: 97% (23 @ 07:53) (97% - 99%)    GENERAL: NAD, Resting comfortably in bed, awake, opens eyes spontaneously  HEENT: NCAT, MMM, Normal conjunctiva  RESP: Nonlabored breathing on room air, No respiratory distress  CARD: Normal rate, Normal peripheral perfusion  GI: Soft, ND, mild tenderness in LUQ and LLQ, No guarding, No rebound tenderness, normoactive bowel sounds  AGGIE: Perianal depigmentation, Negative for external hemorrhoids, Soft bloody stool in rectal vault (maroon colored blood), Loose rectal tone, No masses/hemorrhoids palpated  EXTREM: WWP, No edema, No gross deformity of extremities  SKIN: No rashes with the exception of perianal depigmentation, no lesions  NEURO: Alert and awake, No focal motor or sensory deficits  PSYCH: Affect and characteristics of appearance, verbalizations, and behaviors are appropriate    LABS:                        9.9    7.68  )-----------( 220      ( 2023 08:04 )             30.2     -    140  |  102  |  13  ----------------------------<  240<H>  4.2   |  28  |  1.10    Ca    8.9      2023 01:42    TPro  7.4  /  Alb  3.4  /  TBili  0.2  /  DBili  x   /  AST  15  /  ALT  18  /  AlkPhos  76  02-03    PT/INR - ( 2023 08:10 )   PT: 13.2 sec;   INR: 1.11          PTT - ( 2023 08:10 )  PTT:28.9 sec  LIVER FUNCTIONS - ( 2023 01:42 )  Alb: 3.4 g/dL / Pro: 7.4 g/dL / ALK PHOS: 76 U/L / ALT: 18 U/L / AST: 15 U/L / GGT: x             RADIOLOGY & ADDITIONAL STUDIES:    ACC: [id number] EXAM: CT ANGIO ABD PELV (W)AW IC ORDERED BY: MARLON STRAUSS    PROCEDURE DATE: 2023        INTERPRETATION: Exam: CTA Abdomen and Pelvis Without And With Contrast  Exam date and time: 2/3/2023 4:35 AM  Age: 82 years old  Clinical indication: Rectal bleed, elev lactic, llq abd pain    TECHNIQUE:  Imaging protocol: Computed tomographic angiography of the abdomen and pelvis  without and with contrast.  3D rendering (Not supervised by radiologist): MIP and/or 3D reconstructed  images were created by the technologist. 100 cc Omnipaque 350 administered intravenously. 0 cc discarded.    COMPARISON:  CR XR CHEST 2/3/2023 1:52 AM    FINDINGS:  Lungs: Atelectasis or scarring at the bilateral lung bases.    Aorta: Atherosclerotic disease of the abdominal aorta.  Celiac trunk and mesenteric arteries: No occlusion or significant stenosis. Mild narrowing of the proximal SMA.  Renal arteries: No occlusion or significant stenosis.  Right iliac arteries: No occlusion or significant stenosis.  Left iliac arteries: No occlusion or significant stenosis.    Liver: No mass.  Gallbladder and bile ducts: Unremarkable. No calcified stones. No ductal  dilation.  Pancreas: Unremarkable. No mass. No ductal dilation.  Spleen: Unremarkable. No splenomegaly.  Adrenal glands: Unremarkable. No mass.  Kidneys and ureters: Parapelvic left renal cyst.  Stomach and bowel: Diverticulosis of the colon. No evidence of acute  diverticulitis.  Appendix: No evidence of appendicitis.  Intraperitoneal space: Unremarkable. No free air. No significant fluid  collection.  Lymph nodes: Unremarkable. No enlarged lymph nodes.    Urinary bladder: Unremarkable. No mass.  Reproductive: Status post hysterectomy.  Bones/joints: Multilevel degenerative disease and facet arthropathy.  Degenerative changes in the bilateral sacroiliac joints.  Soft tissues: Fat containing umbilical hernia. Punctate calcifications left central breast.    IMPRESSION:  Extensive diverticulosis of the sigmoid and ascending colon. No evidence of acute diverticulitis. No evidence of active gastrointestinal bleeding. No evidence of mesenteric ischemia.    --- End of Report ---  2/3:  started on low dose ISS, fsg q6hrs, hgb from 11.4 -> 9, consulted surgery and GI, abds ret cound 0.9 hypoproliferative,         MADIHA URENA MD; Attending Radiologist  This document has been electronically signed. Feb 3 2023 8:34AM

## 2023-02-03 NOTE — CONSULT NOTE ADULT - ATTENDING COMMENTS
No further bleeding.   Agree would be better to recover then consider elective cscope as she doesn';t agree to any blood tranfusion.
Patient is an 82 year old woman, Mandaeism, history of HTN, HLD, DM, prolapsed uterus s/p elective hysterectomy 2022, hemorrhoids, chronic constipation now admitted to medicine with lower GI bleed. At time of evaluation, afebrile, hemodynamically stable, abdomen soft, nontender, nondistended, HgB stable, imaging reviewed revealing extensive diverticulosis. Recommend GI consultation for possible endoscopy, serial CBCs, will continue to follow. No acute surgical intervention at this time. Late entry, date of service 2/3/23

## 2023-02-04 DIAGNOSIS — M25.561 PAIN IN RIGHT KNEE: ICD-10-CM

## 2023-02-04 LAB
ALBUMIN SERPL ELPH-MCNC: 3.6 G/DL — SIGNIFICANT CHANGE UP (ref 3.3–5)
ALP SERPL-CCNC: 74 U/L — SIGNIFICANT CHANGE UP (ref 40–120)
ALT FLD-CCNC: 13 U/L — SIGNIFICANT CHANGE UP (ref 10–45)
ANION GAP SERPL CALC-SCNC: 11 MMOL/L — SIGNIFICANT CHANGE UP (ref 5–17)
AST SERPL-CCNC: 19 U/L — SIGNIFICANT CHANGE UP (ref 10–40)
BILIRUB SERPL-MCNC: 0.4 MG/DL — SIGNIFICANT CHANGE UP (ref 0.2–1.2)
BUN SERPL-MCNC: 8 MG/DL — SIGNIFICANT CHANGE UP (ref 7–23)
CALCIUM SERPL-MCNC: 9.3 MG/DL — SIGNIFICANT CHANGE UP (ref 8.4–10.5)
CHLORIDE SERPL-SCNC: 103 MMOL/L — SIGNIFICANT CHANGE UP (ref 96–108)
CO2 SERPL-SCNC: 24 MMOL/L — SIGNIFICANT CHANGE UP (ref 22–31)
CREAT SERPL-MCNC: 0.83 MG/DL — SIGNIFICANT CHANGE UP (ref 0.5–1.3)
CULTURE RESULTS: SIGNIFICANT CHANGE UP
EGFR: 71 ML/MIN/1.73M2 — SIGNIFICANT CHANGE UP
FERRITIN SERPL-MCNC: 177 NG/ML — HIGH (ref 15–150)
GLUCOSE BLDC GLUCOMTR-MCNC: 171 MG/DL — HIGH (ref 70–99)
GLUCOSE BLDC GLUCOMTR-MCNC: 187 MG/DL — HIGH (ref 70–99)
GLUCOSE BLDC GLUCOMTR-MCNC: 235 MG/DL — HIGH (ref 70–99)
GLUCOSE BLDC GLUCOMTR-MCNC: 239 MG/DL — HIGH (ref 70–99)
GLUCOSE BLDC GLUCOMTR-MCNC: 280 MG/DL — HIGH (ref 70–99)
GLUCOSE SERPL-MCNC: 176 MG/DL — HIGH (ref 70–99)
HCT VFR BLD CALC: 38.3 % — SIGNIFICANT CHANGE UP (ref 34.5–45)
HGB BLD-MCNC: 11.5 G/DL — SIGNIFICANT CHANGE UP (ref 11.5–15.5)
IRON SATN MFR SERPL: 28 % — SIGNIFICANT CHANGE UP (ref 14–50)
IRON SATN MFR SERPL: 61 UG/DL — SIGNIFICANT CHANGE UP (ref 30–160)
LACTATE SERPL-SCNC: 1.7 MMOL/L — SIGNIFICANT CHANGE UP (ref 0.5–2)
MAGNESIUM SERPL-MCNC: 2 MG/DL — SIGNIFICANT CHANGE UP (ref 1.6–2.6)
MCHC RBC-ENTMCNC: 28 PG — SIGNIFICANT CHANGE UP (ref 27–34)
MCHC RBC-ENTMCNC: 30 GM/DL — LOW (ref 32–36)
MCV RBC AUTO: 93.4 FL — SIGNIFICANT CHANGE UP (ref 80–100)
NRBC # BLD: 0 /100 WBCS — SIGNIFICANT CHANGE UP (ref 0–0)
PHOSPHATE SERPL-MCNC: 3.9 MG/DL — SIGNIFICANT CHANGE UP (ref 2.5–4.5)
PLATELET # BLD AUTO: 210 K/UL — SIGNIFICANT CHANGE UP (ref 150–400)
POTASSIUM SERPL-MCNC: 4.2 MMOL/L — SIGNIFICANT CHANGE UP (ref 3.5–5.3)
POTASSIUM SERPL-SCNC: 4.2 MMOL/L — SIGNIFICANT CHANGE UP (ref 3.5–5.3)
PROT SERPL-MCNC: 6.8 G/DL — SIGNIFICANT CHANGE UP (ref 6–8.3)
RBC # BLD: 4.1 M/UL — SIGNIFICANT CHANGE UP (ref 3.8–5.2)
RBC # FLD: 14.5 % — SIGNIFICANT CHANGE UP (ref 10.3–14.5)
SODIUM SERPL-SCNC: 138 MMOL/L — SIGNIFICANT CHANGE UP (ref 135–145)
SPECIMEN SOURCE: SIGNIFICANT CHANGE UP
TIBC SERPL-MCNC: 219 UG/DL — LOW (ref 220–430)
UIBC SERPL-MCNC: 158 UG/DL — SIGNIFICANT CHANGE UP (ref 110–370)
WBC # BLD: 10.22 K/UL — SIGNIFICANT CHANGE UP (ref 3.8–10.5)
WBC # FLD AUTO: 10.22 K/UL — SIGNIFICANT CHANGE UP (ref 3.8–10.5)

## 2023-02-04 PROCEDURE — 99232 SBSQ HOSP IP/OBS MODERATE 35: CPT

## 2023-02-04 PROCEDURE — 99231 SBSQ HOSP IP/OBS SF/LOW 25: CPT

## 2023-02-04 PROCEDURE — 99233 SBSQ HOSP IP/OBS HIGH 50: CPT | Mod: GC

## 2023-02-04 RX ORDER — INSULIN GLARGINE 100 [IU]/ML
10 INJECTION, SOLUTION SUBCUTANEOUS AT BEDTIME
Refills: 0 | Status: DISCONTINUED | OUTPATIENT
Start: 2023-02-04 | End: 2023-02-06

## 2023-02-04 RX ORDER — HYDRALAZINE HCL 50 MG
10 TABLET ORAL ONCE
Refills: 0 | Status: COMPLETED | OUTPATIENT
Start: 2023-02-04 | End: 2023-02-04

## 2023-02-04 RX ORDER — LISINOPRIL 2.5 MG/1
20 TABLET ORAL ONCE
Refills: 0 | Status: COMPLETED | OUTPATIENT
Start: 2023-02-04 | End: 2023-02-04

## 2023-02-04 RX ORDER — ACETAMINOPHEN 500 MG
650 TABLET ORAL EVERY 6 HOURS
Refills: 0 | Status: DISCONTINUED | OUTPATIENT
Start: 2023-02-04 | End: 2023-02-06

## 2023-02-04 RX ORDER — AMLODIPINE BESYLATE 2.5 MG/1
5 TABLET ORAL ONCE
Refills: 0 | Status: COMPLETED | OUTPATIENT
Start: 2023-02-04 | End: 2023-02-04

## 2023-02-04 RX ORDER — AMLODIPINE BESYLATE 2.5 MG/1
5 TABLET ORAL EVERY 24 HOURS
Refills: 0 | Status: DISCONTINUED | OUTPATIENT
Start: 2023-02-04 | End: 2023-02-05

## 2023-02-04 RX ADMIN — Medication 10 MILLIGRAM(S): at 09:27

## 2023-02-04 RX ADMIN — LISINOPRIL 20 MILLIGRAM(S): 2.5 TABLET ORAL at 06:53

## 2023-02-04 RX ADMIN — INSULIN GLARGINE 10 UNIT(S): 100 INJECTION, SOLUTION SUBCUTANEOUS at 22:30

## 2023-02-04 RX ADMIN — Medication 50 MILLIGRAM(S): at 06:53

## 2023-02-04 RX ADMIN — Medication 2: at 06:52

## 2023-02-04 RX ADMIN — Medication 4: at 18:45

## 2023-02-04 RX ADMIN — Medication 2: at 12:04

## 2023-02-04 RX ADMIN — Medication 6: at 22:28

## 2023-02-04 RX ADMIN — PANTOPRAZOLE SODIUM 40 MILLIGRAM(S): 20 TABLET, DELAYED RELEASE ORAL at 18:50

## 2023-02-04 RX ADMIN — LISINOPRIL 20 MILLIGRAM(S): 2.5 TABLET ORAL at 12:02

## 2023-02-04 RX ADMIN — PANTOPRAZOLE SODIUM 40 MILLIGRAM(S): 20 TABLET, DELAYED RELEASE ORAL at 06:52

## 2023-02-04 RX ADMIN — AMLODIPINE BESYLATE 5 MILLIGRAM(S): 2.5 TABLET ORAL at 18:47

## 2023-02-04 NOTE — PHYSICAL THERAPY INITIAL EVALUATION ADULT - NSPTDMEREC_GEN_A_CORE
will continue to assess for quad cane vs RW depending on improvement with gait./narrow-based quad cane

## 2023-02-04 NOTE — PROGRESS NOTE ADULT - SUBJECTIVE AND OBJECTIVE BOX
SUBJECTIVE: Patient seen and examined bedside by surgery team. Patient denies any blood per rectum or bloody bowel movements since admission yesterday. Denies any acute concerns. -F/C/CP/SOB/N/V    amLODIPine   Tablet 5 milliGRAM(s) Oral every 24 hours  lisinopril 20 milliGRAM(s) Oral daily  metoprolol succinate ER 50 milliGRAM(s) Oral daily      Vital Signs Last 24 Hrs  T(C): 37 (04 Feb 2023 16:10), Max: 37 (04 Feb 2023 16:10)  T(F): 98.6 (04 Feb 2023 16:10), Max: 98.6 (04 Feb 2023 16:10)  HR: 84 (04 Feb 2023 18:45) (72 - 84)  BP: 156/66 (04 Feb 2023 18:45) (150/52 - 190/82)  BP(mean): 95 (04 Feb 2023 18:45) (95 - 118)  RR: 18 (04 Feb 2023 18:45) (18 - 20)  SpO2: 100% (04 Feb 2023 18:45) (95% - 100%)    Parameters below as of 04 Feb 2023 18:45  Patient On (Oxygen Delivery Method): room air      I&O's Detail    03 Feb 2023 07:01  -  04 Feb 2023 07:00  --------------------------------------------------------  IN:  Total IN: 0 mL    OUT:    Voided (mL): 2000 mL  Total OUT: 2000 mL    Total NET: -2000 mL          General: NAD, resting comfortably in bed  C/V: NSR  Pulm: Nonlabored breathing, no respiratory distress  Abd: soft, minimal diffuse tenderness, non distended.   Extrem: WWP, no edema, SCDs in place        LABS:                        11.5   10.22 )-----------( 210      ( 04 Feb 2023 15:25 )             38.3     02-04    138  |  103  |  8   ----------------------------<  176<H>  4.2   |  24  |  0.83    Ca    9.3      04 Feb 2023 07:33  Phos  3.9     02-04  Mg     2.0     02-04    TPro  6.8  /  Alb  3.6  /  TBili  0.4  /  DBili  x   /  AST  19  /  ALT  13  /  AlkPhos  74  02-04    PT/INR - ( 03 Feb 2023 08:10 )   PT: 13.2 sec;   INR: 1.11          PTT - ( 03 Feb 2023 08:10 )  PTT:28.9 sec  Urinalysis Basic - ( 03 Feb 2023 02:19 )    Color: Yellow / Appearance: Clear / SG: <=1.005 / pH: x  Gluc: x / Ketone: NEGATIVE  / Bili: NEGATIVE / Urobili: 0.2 E.U./dL   Blood: x / Protein: NEGATIVE mg/dL / Nitrite: NEGATIVE   Leuk Esterase: NEGATIVE / RBC: 5-10 /HPF / WBC < 5 /HPF   Sq Epi: x / Non Sq Epi: 5-10 /HPF / Bacteria: Present /HPF        RADIOLOGY & ADDITIONAL STUDIES:

## 2023-02-04 NOTE — PHYSICAL THERAPY INITIAL EVALUATION ADULT - GAIT PATTERN USED, PT EVAL
Pt ambulated from bedside to hallway with SBA initially but began to demonstrate difficulty weight shifting 2/2 R knee pain and had two episodes R knee buckling requiring CGA. Upon turning to return to room, pt have a significant LOB requiring PT assist to recover. Pt able to ambulate back to bedside with CGA./2-point gait

## 2023-02-04 NOTE — PHYSICAL THERAPY INITIAL EVALUATION ADULT - PERTINENT HX OF CURRENT PROBLEM, REHAB EVAL
81yo Y[employer] F with PMHx of HTH, HLD, T2DM, prolapsed uterus s/p elective hystrectomy (09/12/2022, [facility]), L carotid bruit, thyroid nodule, gestational internal hemorrhoids, chronic constipation and prior cardiac arrhythmia (irregular) not on AC, presented with BRBPR and syncopal event, admitted to medicine for further workup.

## 2023-02-04 NOTE — PROGRESS NOTE ADULT - PROBLEM SELECTOR PLAN 1
Reports rectal bleeding while traveling home on [employer]. Patient reports last colonoscopy was 3 years ago in which she underwent polypectomy and was advised they were benign. On admission, AGGIE reported to be negative for external hemorrhoids, soft maroon colored stool in rectal vault, no masses/hemorrhoids palpated.   - two large bore IVs  - CBCs BID  - pantoprazole 8mg/hr gtt  - consult surgery for anoscopy   - consult GI for possible colonoscopy/endoscopy Reports rectal bleeding while traveling home on [employer]. Patient reports last colonoscopy was 3 years ago in which she underwent polypectomy and was advised they were benign. On admission, AGGIE reported to be negative for external hemorrhoids, soft maroon colored stool in rectal vault, no masses/hemorrhoids palpated.   - two large bore IVs  - CBCs BID  - pantoprazole 8mg/hr gtt  - consuted surgery, no acute interventions   - consulted GI, colonoscopy/endoscopy plan for sunday Reports rectal bleeding while traveling home on [employer]. Patient reports last colonoscopy was 3 years ago in which she underwent polypectomy and was advised they were benign. On admission, AGGIE reported to be negative for external hemorrhoids, soft maroon colored stool in rectal vault, no masses/hemorrhoids palpated.   - two large bore IVs  - CBCs BID  - pantoprazole 40mg IV BID  - consuted surgery, no acute interventions   - consulted GI, nonurgent colonoscopy/endoscopy plan for Monday Reports rectal bleeding while traveling home on [employer]. Patient reports last colonoscopy was 3 years ago in which she underwent polypectomy and was advised they were benign. On admission, AGGIE with soft maroon colored stool in rectal vault, negative for external hemorrhoids, no masses/hemorrhoids palpated.   - two large bore IVs  - CBCs BID  - pantoprazole 40mg IV BID  - consuted surgery, no acute interventions   - consulted GI, nonurgent colonoscopy/endoscopy plan for Monday

## 2023-02-04 NOTE — PROGRESS NOTE ADULT - PROBLEM SELECTOR PLAN 4
Home meds: rosuvastatin 20mg qHS  - c/w rosuvastatin 20mg qHS Patient has had R knee pain since vasosagal episode while on [employer]. Denies recent trauma. Patient has full ROM however pain with ambulating.  - will obtain knee XR to r/o fracture

## 2023-02-04 NOTE — PROGRESS NOTE ADULT - PROBLEM SELECTOR PLAN 7
Plan:  F: s/p 2L NS in the ED   E: replete K<4, Mg<2  N: consistent carb, transition to CLD tomorrow  VTE Prophylaxis: None, i/s/o GIB  C: Full Code  D: 7La

## 2023-02-04 NOTE — PHYSICAL THERAPY INITIAL EVALUATION ADULT - GENERAL OBSERVATIONS, REHAB EVAL
Initial Anesthesia Post-op Note    Patient: Annetta Brown  Procedure(s) Performed: EGD  Anesthesia type: MAC    Vitals Value Taken Time   Temp 36.7 01/15/21 1621   Pulse 70 01/15/21 1621   Resp 12 01/15/21 1621   SpO2 100 % 01/15/21 1621   /59 01/15/21 1620   Vitals shown include unvalidated device data.      Patient Location: PACU Phase 1  Post-op Vital Signs:stable  Level of Consciousness: awake and alert  Respiratory Status: spontaneous ventilation  Cardiovascular stable  Hydration: euvolemic  Pain Management: adequately controlled  Handoff: Handoff to receiving nurse was performed and questions were answered  Vomiting: none   Nausea: None  Airway Patency:patent  Post-op Assessment: no complications and patient tolerated procedure well with no complications      No complications documented.  
PT IE Completed. Pt received semi-supine in bed, A&Ox4, +RA,+heplock, in NAD and agreeable to work with PT, WILLIAM Calle notified, cleared to see pt with -170s. MD Christensen .Pt presents to [facility] with BRBPR and syncopal event . PT exam shows R knee buckling and 1 moderate LOB with 5/10 knee pain during ambulation. Pt completed bed mob, transfers and gait. Pt left seated in bedside chair, +bed alarm, +call arias within reach, WILLIAM Calle and MD Christensen notified. Pt can benefit from PT in order to improve quality of life by increasing strength/endurance/balance with functional mobility and ADLS.

## 2023-02-04 NOTE — PROGRESS NOTE ADULT - SUBJECTIVE AND OBJECTIVE BOX
Pt seen and examined at bedside.  NAEO. No BMs since yesterday AM.  Feels well. Tolerating liquid diet. No nausea/vomiting. Vague L sided abd pain    Allergies    No Known Allergies    Intolerances        MEDICATIONS:  MEDICATIONS  (STANDING):  dextrose 5%. 1000 milliLiter(s) (50 mL/Hr) IV Continuous <Continuous>  dextrose 5%. 1000 milliLiter(s) (100 mL/Hr) IV Continuous <Continuous>  dextrose 50% Injectable 25 Gram(s) IV Push once  dextrose 50% Injectable 12.5 Gram(s) IV Push once  dextrose 50% Injectable 25 Gram(s) IV Push once  glucagon  Injectable 1 milliGRAM(s) IntraMuscular once  insulin lispro (ADMELOG) corrective regimen sliding scale   SubCutaneous Before meals and at bedtime  lisinopril 20 milliGRAM(s) Oral daily  metoprolol succinate ER 50 milliGRAM(s) Oral daily  pantoprazole  Injectable 40 milliGRAM(s) IV Push every 12 hours    MEDICATIONS  (PRN):  acetaminophen     Tablet .. 650 milliGRAM(s) Oral every 6 hours PRN Temp greater or equal to 38C (100.4F), Mild Pain (1 - 3)  dextrose Oral Gel 15 Gram(s) Oral once PRN Blood Glucose LESS THAN 70 milliGRAM(s)/deciliter      Vital Signs Last 24 Hrs  T(C): 36.8 (04 Feb 2023 09:17), Max: 36.9 (03 Feb 2023 17:45)  T(F): 98.2 (04 Feb 2023 09:17), Max: 98.5 (03 Feb 2023 17:45)  HR: 80 (04 Feb 2023 09:42) (72 - 97)  BP: 150/52 (04 Feb 2023 09:42) (133/88 - 214/91)  BP(mean): 97 (04 Feb 2023 09:42) (97 - 133)  RR: 18 (04 Feb 2023 09:42) (18 - 20)  SpO2: 100% (04 Feb 2023 09:42) (95% - 100%)    Parameters below as of 04 Feb 2023 09:42  Patient On (Oxygen Delivery Method): room air        02-03 @ 07:01  -  02-04 @ 07:00  --------------------------------------------------------  IN: 0 mL / OUT: 2000 mL / NET: -2000 mL        PHYSICAL EXAM:    General: No acute distress  HEENT: MMM, conjunctiva and sclera clear  Gastrointestinal: Soft L sided TTP non-distended. No rebound or guarding  Skin: Warm and dry. No obvious rash    LABS:  CBC Full  -  ( 03 Feb 2023 12:38 )  WBC Count : 7.46 K/uL  RBC Count : 3.73 M/uL  Hemoglobin : 10.4 g/dL  Hematocrit : 33.2 %  Platelet Count - Automated : 230 K/uL  Mean Cell Volume : 89.0 fl  Mean Cell Hemoglobin : 27.9 pg  Mean Cell Hemoglobin Concentration : 31.3 gm/dL  Auto Neutrophil # : x  Auto Lymphocyte # : x  Auto Monocyte # : x  Auto Eosinophil # : x  Auto Basophil # : x  Auto Neutrophil % : x  Auto Lymphocyte % : x  Auto Monocyte % : x  Auto Eosinophil % : x  Auto Basophil % : x    02-04    138  |  103  |  8   ----------------------------<  176<H>  4.2   |  24  |  0.83    Ca    9.3      04 Feb 2023 07:33  Phos  3.9     02-04  Mg     2.0     02-04    TPro  6.8  /  Alb  3.6  /  TBili  0.4  /  DBili  x   /  AST  19  /  ALT  13  /  AlkPhos  74  02-04    PT/INR - ( 03 Feb 2023 08:10 )   PT: 13.2 sec;   INR: 1.11          PTT - ( 03 Feb 2023 08:10 )  PTT:28.9 sec      Urinalysis Basic - ( 03 Feb 2023 02:19 )    Color: Yellow / Appearance: Clear / SG: <=1.005 / pH: x  Gluc: x / Ketone: NEGATIVE  / Bili: NEGATIVE / Urobili: 0.2 E.U./dL   Blood: x / Protein: NEGATIVE mg/dL / Nitrite: NEGATIVE   Leuk Esterase: NEGATIVE / RBC: 5-10 /HPF / WBC < 5 /HPF   Sq Epi: x / Non Sq Epi: 5-10 /HPF / Bacteria: Present /HPF                RADIOLOGY & ADDITIONAL STUDIES:

## 2023-02-04 NOTE — PROGRESS NOTE ADULT - PROBLEM SELECTOR PLAN 5
Takes Januvia 100mg qd and Lantus 30U once a day. Patient currently NPO.  - obtain A1C  - start low dose iSS  - monitor FS  - continue NPO for now given GI bleed Takes Januvia 100mg qd and Lantus 30U once a day. Patient currently NPO.  - obtain A1C  - on moderate iSS  - start lantus 30U tonight given patient was full diet today  - monitor FS Home meds: rosuvastatin 20mg qHS  - c/w rosuvastatin 20mg qHS

## 2023-02-04 NOTE — PROGRESS NOTE ADULT - ASSESSMENT
82-year-old female [employer]ism with PMHx of HTH, HLD, T2DM, prolapsed uterus s/p elective hysterectomy (09/12/2022, [facility]), chronic constipation who presented to the ED with rectal bleeding    #Rectal bleeding  #Anemia    No bleeding overnight, though continued vague abd discomfort/TTP on exam  CT imaging with extensive diverticulosis which may explain presentation given clinical picture at this point  Hgb pending this AM    Recommendations:  -Pt declined blood transfusions as she is Jehova's witness  -Supportive care otherwise, monitor Hgb closely  -May advance diet today if Hgb stable  -Clear liquids tomorrow, golytely prep in PM of 2/5 and NPO at midnight for colonoscopy on 2/6.     We will continue to follow    Rehan Whitaker DO  Gastroenterology Fellow  Pager: [phone]

## 2023-02-04 NOTE — PROGRESS NOTE ADULT - PROBLEM SELECTOR PLAN 6
Plan:  F: s/p 2L NS in the ED   E: replete K<4, Mg<2  N: regular  VTE Prophylaxis: None, i/s/o GIB  C: Full Code  D: 7La Plan:  F: s/p 2L NS in the ED   E: replete K<4, Mg<2  N: consistent carb, transition to CLD tomorrow  VTE Prophylaxis: None, i/s/o GIB  C: Full Code  D: 7La Takes Januvia 100mg qd and Lantus 30U once a day, prescribed to take at bedtime. Patient reports taking lantus sometimes in AM and sometimes in PM.  - obtain A1C  - on moderate iSS  - start lantus 10U tonight given patient was on full diet today  - monitor FS

## 2023-02-04 NOTE — PROGRESS NOTE ADULT - ASSESSMENT
82-year-old Shinto female with PMHx of HTH, HLD, T2DM, prolapsed uterus s/p elective hystrectomy (09/12/2022, [facility]), L carotid bruit, thyroid nodule, gestational internal hemorrhoids, chronic constipation and prior cardiac arrhythmia (irregular) not on AC, presented with BRBPR and syncopal event, admitted to medicine for further workup. 82-year-old Roman Catholic female with PMHx of HTH, HLD, T2DM, prolapsed uterus s/p elective hystrectomy (09/12/2022, [facility]), L carotid bruit, thyroid nodule, gestational internal hemorrhoids, chronic constipation and prior cardiac arrhythmia (irregular) not on AC, presented with BRBPR and syncopal event, admitted to medicine for further workup. 82-year-old F with PMHx of HTH, HLD, T2DM, prolapsed uterus s/p elective hystrectomy (09/12/2022, [facility]), L carotid bruit, thyroid nodule, gestational internal hemorrhoids, chronic constipation and prior cardiac arrhythmia (irregular) not on AC, presented with BRBPR and syncopal event, admitted to medicine for further workup.

## 2023-02-04 NOTE — PROGRESS NOTE ADULT - PROBLEM SELECTOR PLAN 3
On admission, presented with /89,. On home lisinopril 20mg qd, toprol 50mg qd  - hold home metoprolol succinate 50mg qd due to GI bleed  - c/w home lisinopril 10mg qd  - monitor blood pressure, hydralazine 5mg IVP PRN for sBP >170 persistently On admission, presented with /89. On home lisinopril 20mg qd, toprol 50mg qd.  - resume metoprolol succinate 50mg qd  - c/w home lisinopril 20mg qd  - monitor blood pressure, hydralazine 5mg IVP PRN for sBP >170 persistently On admission, presented with /89. On home lisinopril 20mg qd, toprol 50mg qd. Per surescripts patient was on amlodipine 5mg qd but states that she stopped taking the medication  - resume metoprolol succinate 50mg qd  - c/w home lisinopril 20mg qd  - gave additional lisinopril 20mg x1 today  - consider starting amlodipine  - monitor blood pressure, hydralazine 5mg IVP PRN for sBP >170 persistently History of constipation.  - monitor for BM  - bowel regimen

## 2023-02-04 NOTE — PROGRESS NOTE ADULT - PROBLEM SELECTOR PLAN 2
History of constipation.  - monitor for BM  - bowel regimen On admission, presented with /89. On home lisinopril 20mg qd, toprol 50mg qd. Per surescripts patient was on amlodipine 5mg qd but states that she stopped taking the medication  - resume metoprolol succinate 50mg qd  - c/w home lisinopril 20mg qd  - gave additional lisinopril 20mg x1 today  - consider starting amlodipine  - monitor blood pressure, hydralazine 5mg IVP PRN for sBP >170 persistently

## 2023-02-04 NOTE — PROGRESS NOTE ADULT - ASSESSMENT
82-year-old Nondenominational female with PMHx of HTH, HLD, T2DM, prolapsed uterus s/p elective hystrectomy (09/12/2022, [facility]), L carotid bruit, thyroid nodule, tinea pedia, gestational hemorrhoids, chronic constipation and cardiac arrhythmia (irregular) is consulted to General Surgery for evaluation of bleeding per rectum.    PLAN  - Agree with GI consult recommendations for colonoscopy  - Recommend CBC Q6H  - Recommend PPI if UGI suspected  - Surgery Team 4C will continue to follow  - Please page Team 4 at [phone] with any questions and/or clinical changes

## 2023-02-04 NOTE — PROGRESS NOTE ADULT - SUBJECTIVE AND OBJECTIVE BOX
**Incomplete Note**   CC: Patient is a 82y old  Female who presents with a chief complaint of BRBPR (04 Feb 2023 11:38)      INTERVAL EVENTS: OLIVER    SUBJECTIVE / INTERVAL HPI: Patient seen and examined at bedside.     ROS: negative unless otherwise stated above.    VITAL SIGNS:  Vital Signs Last 24 Hrs  T(C): 37 (04 Feb 2023 16:10), Max: 37 (04 Feb 2023 16:10)  T(F): 98.6 (04 Feb 2023 16:10), Max: 98.6 (04 Feb 2023 16:10)  HR: 76 (04 Feb 2023 16:05) (72 - 87)  BP: 175/77 (04 Feb 2023 16:05) (133/88 - 190/82)  BP(mean): 110 (04 Feb 2023 16:05) (97 - 120)  RR: 18 (04 Feb 2023 16:05) (18 - 20)  SpO2: 99% (04 Feb 2023 16:05) (95% - 100%)    Parameters below as of 04 Feb 2023 16:05  Patient On (Oxygen Delivery Method): room air          02-03-23 @ 07:01  -  02-04-23 @ 07:00  --------------------------------------------------------  IN: 0 mL / OUT: 2000 mL / NET: -2000 mL        PHYSICAL EXAM:    General: NAD  HEENT: MMM  Neck: supple  Cardiovascular: +S1/S2; RRR  Respiratory: CTA B/L; no W/R/R  Gastrointestinal: soft, NT/ND  Extremities: WWP; no edema, clubbing or cyanosis  Vascular: 2+ radial, DP/PT pulses B/L  Neurological: AAOx3; no focal deficits    MEDICATIONS:  MEDICATIONS  (STANDING):  dextrose 5%. 1000 milliLiter(s) (50 mL/Hr) IV Continuous <Continuous>  dextrose 5%. 1000 milliLiter(s) (100 mL/Hr) IV Continuous <Continuous>  dextrose 50% Injectable 25 Gram(s) IV Push once  dextrose 50% Injectable 12.5 Gram(s) IV Push once  dextrose 50% Injectable 25 Gram(s) IV Push once  glucagon  Injectable 1 milliGRAM(s) IntraMuscular once  insulin lispro (ADMELOG) corrective regimen sliding scale   SubCutaneous Before meals and at bedtime  lisinopril 20 milliGRAM(s) Oral daily  metoprolol succinate ER 50 milliGRAM(s) Oral daily  pantoprazole  Injectable 40 milliGRAM(s) IV Push every 12 hours    MEDICATIONS  (PRN):  acetaminophen     Tablet .. 650 milliGRAM(s) Oral every 6 hours PRN Temp greater or equal to 38C (100.4F), Mild Pain (1 - 3)  dextrose Oral Gel 15 Gram(s) Oral once PRN Blood Glucose LESS THAN 70 milliGRAM(s)/deciliter      ALLERGIES:  Allergies    No Known Allergies    Intolerances        LABS:                        11.5   10.22 )-----------( 210      ( 04 Feb 2023 15:25 )             38.3     02-04    138  |  103  |  8   ----------------------------<  176<H>  4.2   |  24  |  0.83    Ca    9.3      04 Feb 2023 07:33  Phos  3.9     02-04  Mg     2.0     02-04    TPro  6.8  /  Alb  3.6  /  TBili  0.4  /  DBili  x   /  AST  19  /  ALT  13  /  AlkPhos  74  02-04    PT/INR - ( 03 Feb 2023 08:10 )   PT: 13.2 sec;   INR: 1.11          PTT - ( 03 Feb 2023 08:10 )  PTT:28.9 sec  Urinalysis Basic - ( 03 Feb 2023 02:19 )    Color: Yellow / Appearance: Clear / SG: <=1.005 / pH: x  Gluc: x / Ketone: NEGATIVE  / Bili: NEGATIVE / Urobili: 0.2 E.U./dL   Blood: x / Protein: NEGATIVE mg/dL / Nitrite: NEGATIVE   Leuk Esterase: NEGATIVE / RBC: 5-10 /HPF / WBC < 5 /HPF   Sq Epi: x / Non Sq Epi: 5-10 /HPF / Bacteria: Present /HPF      CAPILLARY BLOOD GLUCOSE      POCT Blood Glucose.: 171 mg/dL (04 Feb 2023 11:58)      RADIOLOGY & ADDITIONAL TESTS: Reviewed.   CC: Patient is a 82y old  Female who presents with a chief complaint of BRBPR (04 Feb 2023 11:38)      INTERVAL EVENTS: O/N hypertensive to 180-190s, remained asymptomatic during episodes. Given hydralazine 5mg x1 IVP.    SUBJECTIVE / INTERVAL HPI: Patient seen and examined at bedside. Reports no further BMs since admission. Denies     ROS: negative unless otherwise stated above.    VITAL SIGNS:  Vital Signs Last 24 Hrs  T(C): 37 (04 Feb 2023 16:10), Max: 37 (04 Feb 2023 16:10)  T(F): 98.6 (04 Feb 2023 16:10), Max: 98.6 (04 Feb 2023 16:10)  HR: 76 (04 Feb 2023 16:05) (72 - 87)  BP: 175/77 (04 Feb 2023 16:05) (133/88 - 190/82)  BP(mean): 110 (04 Feb 2023 16:05) (97 - 120)  RR: 18 (04 Feb 2023 16:05) (18 - 20)  SpO2: 99% (04 Feb 2023 16:05) (95% - 100%)    Parameters below as of 04 Feb 2023 16:05  Patient On (Oxygen Delivery Method): room air          02-03-23 @ 07:01  -  02-04-23 @ 07:00  --------------------------------------------------------  IN: 0 mL / OUT: 2000 mL / NET: -2000 mL        PHYSICAL EXAM:    General: NAD  HEENT: MMM  Neck: supple  Cardiovascular: +S1/S2; RRR  Respiratory: CTA B/L; no W/R/R  Gastrointestinal: soft, NT/ND  Extremities: WWP; no edema, clubbing or cyanosis  Vascular: 2+ radial, DP/PT pulses B/L  Neurological: AAOx3; no focal deficits    MEDICATIONS:  MEDICATIONS  (STANDING):  dextrose 5%. 1000 milliLiter(s) (50 mL/Hr) IV Continuous <Continuous>  dextrose 5%. 1000 milliLiter(s) (100 mL/Hr) IV Continuous <Continuous>  dextrose 50% Injectable 25 Gram(s) IV Push once  dextrose 50% Injectable 12.5 Gram(s) IV Push once  dextrose 50% Injectable 25 Gram(s) IV Push once  glucagon  Injectable 1 milliGRAM(s) IntraMuscular once  insulin lispro (ADMELOG) corrective regimen sliding scale   SubCutaneous Before meals and at bedtime  lisinopril 20 milliGRAM(s) Oral daily  metoprolol succinate ER 50 milliGRAM(s) Oral daily  pantoprazole  Injectable 40 milliGRAM(s) IV Push every 12 hours    MEDICATIONS  (PRN):  acetaminophen     Tablet .. 650 milliGRAM(s) Oral every 6 hours PRN Temp greater or equal to 38C (100.4F), Mild Pain (1 - 3)  dextrose Oral Gel 15 Gram(s) Oral once PRN Blood Glucose LESS THAN 70 milliGRAM(s)/deciliter      ALLERGIES:  Allergies    No Known Allergies    Intolerances        LABS:                        11.5   10.22 )-----------( 210      ( 04 Feb 2023 15:25 )             38.3     02-04    138  |  103  |  8   ----------------------------<  176<H>  4.2   |  24  |  0.83    Ca    9.3      04 Feb 2023 07:33  Phos  3.9     02-04  Mg     2.0     02-04    TPro  6.8  /  Alb  3.6  /  TBili  0.4  /  DBili  x   /  AST  19  /  ALT  13  /  AlkPhos  74  02-04    PT/INR - ( 03 Feb 2023 08:10 )   PT: 13.2 sec;   INR: 1.11          PTT - ( 03 Feb 2023 08:10 )  PTT:28.9 sec  Urinalysis Basic - ( 03 Feb 2023 02:19 )    Color: Yellow / Appearance: Clear / SG: <=1.005 / pH: x  Gluc: x / Ketone: NEGATIVE  / Bili: NEGATIVE / Urobili: 0.2 E.U./dL   Blood: x / Protein: NEGATIVE mg/dL / Nitrite: NEGATIVE   Leuk Esterase: NEGATIVE / RBC: 5-10 /HPF / WBC < 5 /HPF   Sq Epi: x / Non Sq Epi: 5-10 /HPF / Bacteria: Present /HPF      CAPILLARY BLOOD GLUCOSE      POCT Blood Glucose.: 171 mg/dL (04 Feb 2023 11:58)      RADIOLOGY & ADDITIONAL TESTS: Reviewed.   CC: Patient is a 82y old  Female who presents with a chief complaint of BRBPR (04 Feb 2023 11:38)      INTERVAL EVENTS: O/N hypertensive to 180-190s, remained asymptomatic during episodes. Given hydralazine 5mg x1 IVP.    SUBJECTIVE / INTERVAL HPI: Patient seen and examined at bedside. Reports no further BMs since admission. Denies fever, chills, chest pain, abdominal pain, nausea, vomiting. Tolerating CLD with no further BMs since admission.    ROS: negative unless otherwise stated above.    VITAL SIGNS:  Vital Signs Last 24 Hrs  T(C): 37 (04 Feb 2023 16:10), Max: 37 (04 Feb 2023 16:10)  T(F): 98.6 (04 Feb 2023 16:10), Max: 98.6 (04 Feb 2023 16:10)  HR: 76 (04 Feb 2023 16:05) (72 - 87)  BP: 175/77 (04 Feb 2023 16:05) (133/88 - 190/82)  BP(mean): 110 (04 Feb 2023 16:05) (97 - 120)  RR: 18 (04 Feb 2023 16:05) (18 - 20)  SpO2: 99% (04 Feb 2023 16:05) (95% - 100%)    Parameters below as of 04 Feb 2023 16:05  Patient On (Oxygen Delivery Method): room air          02-03-23 @ 07:01  -  02-04-23 @ 07:00  --------------------------------------------------------  IN: 0 mL / OUT: 2000 mL / NET: -2000 mL        PHYSICAL EXAM:  General: NAD, sitting in bed  HEENT: MMM  Neck: supple  Cardiovascular: +S1/S2; RRR  Respiratory: CTA B/L; no W/R/R  Gastrointestinal: soft, NT/ND, +BS  Extremities: WWP; no edema, clubbing or cyanosis  Vascular: 2+ radial, DP/PT pulses B/L  Neurological: AAOx3; no focal deficits    MEDICATIONS:  MEDICATIONS  (STANDING):  dextrose 5%. 1000 milliLiter(s) (50 mL/Hr) IV Continuous <Continuous>  dextrose 5%. 1000 milliLiter(s) (100 mL/Hr) IV Continuous <Continuous>  dextrose 50% Injectable 25 Gram(s) IV Push once  dextrose 50% Injectable 12.5 Gram(s) IV Push once  dextrose 50% Injectable 25 Gram(s) IV Push once  glucagon  Injectable 1 milliGRAM(s) IntraMuscular once  insulin lispro (ADMELOG) corrective regimen sliding scale   SubCutaneous Before meals and at bedtime  lisinopril 20 milliGRAM(s) Oral daily  metoprolol succinate ER 50 milliGRAM(s) Oral daily  pantoprazole  Injectable 40 milliGRAM(s) IV Push every 12 hours    MEDICATIONS  (PRN):  acetaminophen     Tablet .. 650 milliGRAM(s) Oral every 6 hours PRN Temp greater or equal to 38C (100.4F), Mild Pain (1 - 3)  dextrose Oral Gel 15 Gram(s) Oral once PRN Blood Glucose LESS THAN 70 milliGRAM(s)/deciliter      ALLERGIES:  Allergies    No Known Allergies    Intolerances        LABS:                        11.5   10.22 )-----------( 210      ( 04 Feb 2023 15:25 )             38.3     02-04    138  |  103  |  8   ----------------------------<  176<H>  4.2   |  24  |  0.83    Ca    9.3      04 Feb 2023 07:33  Phos  3.9     02-04  Mg     2.0     02-04    TPro  6.8  /  Alb  3.6  /  TBili  0.4  /  DBili  x   /  AST  19  /  ALT  13  /  AlkPhos  74  02-04    PT/INR - ( 03 Feb 2023 08:10 )   PT: 13.2 sec;   INR: 1.11          PTT - ( 03 Feb 2023 08:10 )  PTT:28.9 sec  Urinalysis Basic - ( 03 Feb 2023 02:19 )    Color: Yellow / Appearance: Clear / SG: <=1.005 / pH: x  Gluc: x / Ketone: NEGATIVE  / Bili: NEGATIVE / Urobili: 0.2 E.U./dL   Blood: x / Protein: NEGATIVE mg/dL / Nitrite: NEGATIVE   Leuk Esterase: NEGATIVE / RBC: 5-10 /HPF / WBC < 5 /HPF   Sq Epi: x / Non Sq Epi: 5-10 /HPF / Bacteria: Present /HPF      CAPILLARY BLOOD GLUCOSE      POCT Blood Glucose.: 171 mg/dL (04 Feb 2023 11:58)      RADIOLOGY & ADDITIONAL TESTS: Reviewed.   CC: Patient is a 82y old  Female who presents with a chief complaint of BRBPR (04 Feb 2023 11:38)      INTERVAL EVENTS: O/N hypertensive to 180-190s, remained asymptomatic during episodes. Given hydralazine 5mg x1 IVP.    HOSPITAL COURSE: 82-year-old female with PMHx of HTH, HLD, T2DM, prolapsed uterus s/p elective hystrectomy (09/12/2022, [facility]), L carotid bruit, thyroid nodule, gestational internal hemorrhoids, chronic constipation and prior cardiac arrhythmia (irregular) not on AC, presented with BRBPR and reported vasovagal syncopal event. AGGIE on admission with maroon colored blood. For BRBPR, GI consulted. Patient started on protonix 40mg IV BID, plan for colonoscopy/endoscopy on 2/6. Patient is [employer]ism and doesnt not accept blood transfusions at this time. Hospital course c/b HTN. Initially patients home antihypertensives held given GIB. Resumed home lisinopril 20mg and toprol 50mg however patient remained hypertensive. Home lisinopril was increased to 40mg qd and she was started on amlodipine 5mg which was previously a home medication, however patient self-discontinued.     SUBJECTIVE / INTERVAL HPI: Patient seen and examined at bedside. Reports no further BMs since admission. Denies fever, chills, chest pain, abdominal pain, nausea, vomiting. Tolerating CLD with no further BMs since admission.    ROS: negative unless otherwise stated above.    VITAL SIGNS:  Vital Signs Last 24 Hrs  T(C): 37 (04 Feb 2023 16:10), Max: 37 (04 Feb 2023 16:10)  T(F): 98.6 (04 Feb 2023 16:10), Max: 98.6 (04 Feb 2023 16:10)  HR: 76 (04 Feb 2023 16:05) (72 - 87)  BP: 175/77 (04 Feb 2023 16:05) (133/88 - 190/82)  BP(mean): 110 (04 Feb 2023 16:05) (97 - 120)  RR: 18 (04 Feb 2023 16:05) (18 - 20)  SpO2: 99% (04 Feb 2023 16:05) (95% - 100%)    Parameters below as of 04 Feb 2023 16:05  Patient On (Oxygen Delivery Method): room air          02-03-23 @ 07:01  -  02-04-23 @ 07:00  --------------------------------------------------------  IN: 0 mL / OUT: 2000 mL / NET: -2000 mL        PHYSICAL EXAM:  General: NAD, sitting in bed  HEENT: MMM  Neck: supple  Cardiovascular: +S1/S2; RRR  Respiratory: CTA B/L; no W/R/R  Gastrointestinal: soft, NT/ND, +BS  Extremities: WWP; no edema, clubbing or cyanosis  Vascular: 2+ radial, DP/PT pulses B/L  Neurological: AAOx3; no focal deficits    MEDICATIONS:  MEDICATIONS  (STANDING):  dextrose 5%. 1000 milliLiter(s) (50 mL/Hr) IV Continuous <Continuous>  dextrose 5%. 1000 milliLiter(s) (100 mL/Hr) IV Continuous <Continuous>  dextrose 50% Injectable 25 Gram(s) IV Push once  dextrose 50% Injectable 12.5 Gram(s) IV Push once  dextrose 50% Injectable 25 Gram(s) IV Push once  glucagon  Injectable 1 milliGRAM(s) IntraMuscular once  insulin lispro (ADMELOG) corrective regimen sliding scale   SubCutaneous Before meals and at bedtime  lisinopril 20 milliGRAM(s) Oral daily  metoprolol succinate ER 50 milliGRAM(s) Oral daily  pantoprazole  Injectable 40 milliGRAM(s) IV Push every 12 hours    MEDICATIONS  (PRN):  acetaminophen     Tablet .. 650 milliGRAM(s) Oral every 6 hours PRN Temp greater or equal to 38C (100.4F), Mild Pain (1 - 3)  dextrose Oral Gel 15 Gram(s) Oral once PRN Blood Glucose LESS THAN 70 milliGRAM(s)/deciliter      ALLERGIES:  Allergies    No Known Allergies    Intolerances        LABS:                        11.5   10.22 )-----------( 210      ( 04 Feb 2023 15:25 )             38.3     02-04    138  |  103  |  8   ----------------------------<  176<H>  4.2   |  24  |  0.83    Ca    9.3      04 Feb 2023 07:33  Phos  3.9     02-04  Mg     2.0     02-04    TPro  6.8  /  Alb  3.6  /  TBili  0.4  /  DBili  x   /  AST  19  /  ALT  13  /  AlkPhos  74  02-04    PT/INR - ( 03 Feb 2023 08:10 )   PT: 13.2 sec;   INR: 1.11          PTT - ( 03 Feb 2023 08:10 )  PTT:28.9 sec  Urinalysis Basic - ( 03 Feb 2023 02:19 )    Color: Yellow / Appearance: Clear / SG: <=1.005 / pH: x  Gluc: x / Ketone: NEGATIVE  / Bili: NEGATIVE / Urobili: 0.2 E.U./dL   Blood: x / Protein: NEGATIVE mg/dL / Nitrite: NEGATIVE   Leuk Esterase: NEGATIVE / RBC: 5-10 /HPF / WBC < 5 /HPF   Sq Epi: x / Non Sq Epi: 5-10 /HPF / Bacteria: Present /HPF      CAPILLARY BLOOD GLUCOSE      POCT Blood Glucose.: 171 mg/dL (04 Feb 2023 11:58)      RADIOLOGY & ADDITIONAL TESTS: Reviewed.

## 2023-02-05 ENCOUNTER — TRANSCRIPTION ENCOUNTER (OUTPATIENT)
Age: 83
End: 2023-02-05

## 2023-02-05 DIAGNOSIS — K92.2 GASTROINTESTINAL HEMORRHAGE, UNSPECIFIED: ICD-10-CM

## 2023-02-05 LAB
A1C WITH ESTIMATED AVERAGE GLUCOSE RESULT: 9.5 % — HIGH (ref 4–5.6)
ALBUMIN SERPL ELPH-MCNC: 3.8 G/DL — SIGNIFICANT CHANGE UP (ref 3.3–5)
ALP SERPL-CCNC: 68 U/L — SIGNIFICANT CHANGE UP (ref 40–120)
ALT FLD-CCNC: 12 U/L — SIGNIFICANT CHANGE UP (ref 10–45)
ANION GAP SERPL CALC-SCNC: 10 MMOL/L — SIGNIFICANT CHANGE UP (ref 5–17)
AST SERPL-CCNC: 15 U/L — SIGNIFICANT CHANGE UP (ref 10–40)
BASOPHILS # BLD AUTO: 0.04 K/UL — SIGNIFICANT CHANGE UP (ref 0–0.2)
BASOPHILS NFR BLD AUTO: 0.5 % — SIGNIFICANT CHANGE UP (ref 0–2)
BILIRUB SERPL-MCNC: 0.2 MG/DL — SIGNIFICANT CHANGE UP (ref 0.2–1.2)
BUN SERPL-MCNC: 16 MG/DL — SIGNIFICANT CHANGE UP (ref 7–23)
CALCIUM SERPL-MCNC: 8.9 MG/DL — SIGNIFICANT CHANGE UP (ref 8.4–10.5)
CHLORIDE SERPL-SCNC: 103 MMOL/L — SIGNIFICANT CHANGE UP (ref 96–108)
CO2 SERPL-SCNC: 25 MMOL/L — SIGNIFICANT CHANGE UP (ref 22–31)
CREAT SERPL-MCNC: 0.91 MG/DL — SIGNIFICANT CHANGE UP (ref 0.5–1.3)
EGFR: 64 ML/MIN/1.73M2 — SIGNIFICANT CHANGE UP
EOSINOPHIL # BLD AUTO: 0.27 K/UL — SIGNIFICANT CHANGE UP (ref 0–0.5)
EOSINOPHIL NFR BLD AUTO: 3.3 % — SIGNIFICANT CHANGE UP (ref 0–6)
ESTIMATED AVERAGE GLUCOSE: 226 MG/DL — HIGH (ref 68–114)
GLUCOSE BLDC GLUCOMTR-MCNC: 176 MG/DL — HIGH (ref 70–99)
GLUCOSE BLDC GLUCOMTR-MCNC: 208 MG/DL — HIGH (ref 70–99)
GLUCOSE BLDC GLUCOMTR-MCNC: 223 MG/DL — HIGH (ref 70–99)
GLUCOSE BLDC GLUCOMTR-MCNC: 233 MG/DL — HIGH (ref 70–99)
GLUCOSE SERPL-MCNC: 252 MG/DL — HIGH (ref 70–99)
HCT VFR BLD CALC: 32.5 % — LOW (ref 34.5–45)
HGB BLD-MCNC: 10.4 G/DL — LOW (ref 11.5–15.5)
IMM GRANULOCYTES NFR BLD AUTO: 0.2 % — SIGNIFICANT CHANGE UP (ref 0–0.9)
LYMPHOCYTES # BLD AUTO: 2.3 K/UL — SIGNIFICANT CHANGE UP (ref 1–3.3)
LYMPHOCYTES # BLD AUTO: 27.8 % — SIGNIFICANT CHANGE UP (ref 13–44)
MAGNESIUM SERPL-MCNC: 2.1 MG/DL — SIGNIFICANT CHANGE UP (ref 1.6–2.6)
MCHC RBC-ENTMCNC: 28.3 PG — SIGNIFICANT CHANGE UP (ref 27–34)
MCHC RBC-ENTMCNC: 32 GM/DL — SIGNIFICANT CHANGE UP (ref 32–36)
MCV RBC AUTO: 88.3 FL — SIGNIFICANT CHANGE UP (ref 80–100)
MONOCYTES # BLD AUTO: 0.75 K/UL — SIGNIFICANT CHANGE UP (ref 0–0.9)
MONOCYTES NFR BLD AUTO: 9.1 % — SIGNIFICANT CHANGE UP (ref 2–14)
NEUTROPHILS # BLD AUTO: 4.9 K/UL — SIGNIFICANT CHANGE UP (ref 1.8–7.4)
NEUTROPHILS NFR BLD AUTO: 59.1 % — SIGNIFICANT CHANGE UP (ref 43–77)
NRBC # BLD: 0 /100 WBCS — SIGNIFICANT CHANGE UP (ref 0–0)
PHOSPHATE SERPL-MCNC: 3.4 MG/DL — SIGNIFICANT CHANGE UP (ref 2.5–4.5)
PLATELET # BLD AUTO: 243 K/UL — SIGNIFICANT CHANGE UP (ref 150–400)
POTASSIUM SERPL-MCNC: 4.1 MMOL/L — SIGNIFICANT CHANGE UP (ref 3.5–5.3)
POTASSIUM SERPL-SCNC: 4.1 MMOL/L — SIGNIFICANT CHANGE UP (ref 3.5–5.3)
PROT SERPL-MCNC: 6.7 G/DL — SIGNIFICANT CHANGE UP (ref 6–8.3)
RBC # BLD: 3.68 M/UL — LOW (ref 3.8–5.2)
RBC # FLD: 14.2 % — SIGNIFICANT CHANGE UP (ref 10.3–14.5)
SARS-COV-2 RNA SPEC QL NAA+PROBE: SIGNIFICANT CHANGE UP
SODIUM SERPL-SCNC: 138 MMOL/L — SIGNIFICANT CHANGE UP (ref 135–145)
WBC # BLD: 8.28 K/UL — SIGNIFICANT CHANGE UP (ref 3.8–10.5)
WBC # FLD AUTO: 8.28 K/UL — SIGNIFICANT CHANGE UP (ref 3.8–10.5)

## 2023-02-05 PROCEDURE — 99233 SBSQ HOSP IP/OBS HIGH 50: CPT | Mod: GC

## 2023-02-05 PROCEDURE — 99232 SBSQ HOSP IP/OBS MODERATE 35: CPT

## 2023-02-05 PROCEDURE — 99231 SBSQ HOSP IP/OBS SF/LOW 25: CPT

## 2023-02-05 PROCEDURE — 73562 X-RAY EXAM OF KNEE 3: CPT | Mod: 26,RT

## 2023-02-05 RX ORDER — LISINOPRIL 2.5 MG/1
40 TABLET ORAL EVERY 24 HOURS
Refills: 0 | Status: DISCONTINUED | OUTPATIENT
Start: 2023-02-05 | End: 2023-02-06

## 2023-02-05 RX ORDER — HYDRALAZINE HCL 50 MG
5 TABLET ORAL ONCE
Refills: 0 | Status: COMPLETED | OUTPATIENT
Start: 2023-02-05 | End: 2023-02-05

## 2023-02-05 RX ORDER — AMLODIPINE BESYLATE 2.5 MG/1
10 TABLET ORAL EVERY 24 HOURS
Refills: 0 | Status: DISCONTINUED | OUTPATIENT
Start: 2023-02-06 | End: 2023-02-06

## 2023-02-05 RX ORDER — SOD SULF/SODIUM/NAHCO3/KCL/PEG
4000 SOLUTION, RECONSTITUTED, ORAL ORAL ONCE
Refills: 0 | Status: DISCONTINUED | OUTPATIENT
Start: 2023-02-05 | End: 2023-02-05

## 2023-02-05 RX ORDER — SOD SULF/SODIUM/NAHCO3/KCL/PEG
4000 SOLUTION, RECONSTITUTED, ORAL ORAL ONCE
Refills: 0 | Status: COMPLETED | OUTPATIENT
Start: 2023-02-05 | End: 2023-02-05

## 2023-02-05 RX ORDER — INFLUENZA VIRUS VACCINE 15; 15; 15; 15 UG/.5ML; UG/.5ML; UG/.5ML; UG/.5ML
0.7 SUSPENSION INTRAMUSCULAR ONCE
Refills: 0 | Status: DISCONTINUED | OUTPATIENT
Start: 2023-02-05 | End: 2023-02-06

## 2023-02-05 RX ADMIN — Medication 4: at 11:53

## 2023-02-05 RX ADMIN — Medication 50 MILLIGRAM(S): at 07:15

## 2023-02-05 RX ADMIN — Medication 4: at 07:16

## 2023-02-05 RX ADMIN — Medication 4000 MILLILITER(S): at 17:41

## 2023-02-05 RX ADMIN — INSULIN GLARGINE 10 UNIT(S): 100 INJECTION, SOLUTION SUBCUTANEOUS at 22:06

## 2023-02-05 RX ADMIN — PANTOPRAZOLE SODIUM 40 MILLIGRAM(S): 20 TABLET, DELAYED RELEASE ORAL at 07:16

## 2023-02-05 RX ADMIN — Medication 4: at 17:32

## 2023-02-05 RX ADMIN — AMLODIPINE BESYLATE 5 MILLIGRAM(S): 2.5 TABLET ORAL at 07:15

## 2023-02-05 RX ADMIN — PANTOPRAZOLE SODIUM 40 MILLIGRAM(S): 20 TABLET, DELAYED RELEASE ORAL at 17:36

## 2023-02-05 RX ADMIN — LISINOPRIL 40 MILLIGRAM(S): 2.5 TABLET ORAL at 07:15

## 2023-02-05 RX ADMIN — Medication 5 MILLIGRAM(S): at 00:19

## 2023-02-05 RX ADMIN — Medication 2: at 22:05

## 2023-02-05 NOTE — PROGRESS NOTE ADULT - PROBLEM SELECTOR PLAN 1
Reports rectal bleeding while traveling home on [employer]. Patient reports last colonoscopy was 3 years ago in which she underwent polypectomy and was advised they were benign. On admission, AGGIE with soft maroon colored stool in rectal vault, negative for external hemorrhoids, no masses/hemorrhoids palpated. Surgery consulted, no acute interventions.   Plan:  - Two large bore IVs, CBCs BID  - Pantoprazole 40mg IV BID  - consulted GI, nonurgent colonoscopy/endoscopy plan for Monday 2/6/23  - NPO after m/n

## 2023-02-05 NOTE — PROGRESS NOTE ADULT - PROBLEM SELECTOR PLAN 6
Takes Januvia 100mg qd and Lantus 30U once a day, prescribed to take at bedtime. Patient reports taking lantus sometimes in AM and sometimes in PM.  - obtain A1C  - on moderate iSS  - start lantus 10U   - monitor FS Takes Januvia 100mg qd and Lantus 30U once a day, prescribed to take at bedtime. Patient reports taking lantus sometimes in AM and sometimes in PM.  - A1c 9.1  - will defer titrating insulin regimen given patient currently on clears to be NPO for scope  - on moderate iSS  - start lantus 10U   - monitor FS

## 2023-02-05 NOTE — PROGRESS NOTE ADULT - SUBJECTIVE AND OBJECTIVE BOX
SUBJECTIVE: Patient seen and examined bedside. Patient feeling well, denies any further blood per rectum or bloody BMs. No abdominal discomfort noted. Tolerating PO diet. No acute complaints.    lisinopril 40 milliGRAM(s) Oral every 24 hours  metoprolol succinate ER 50 milliGRAM(s) Oral daily      Vital Signs Last 24 Hrs  T(C): 36.3 (05 Feb 2023 16:00), Max: 37.1 (05 Feb 2023 14:30)  T(F): 97.4 (05 Feb 2023 16:00), Max: 98.7 (05 Feb 2023 14:30)  HR: 75 (05 Feb 2023 16:00) (66 - 80)  BP: 158/74 (05 Feb 2023 16:00) (142/65 - 189/79)  BP(mean): 94 (05 Feb 2023 15:20) (94 - 114)  RR: 18 (05 Feb 2023 16:00) (18 - 19)  SpO2: 96% (05 Feb 2023 16:00) (96% - 100%)    Parameters below as of 05 Feb 2023 16:00  Patient On (Oxygen Delivery Method): room air      I&O's Detail      General: NAD, resting comfortably in bed  C/V: NSR  Pulm: Nonlabored breathing, no respiratory distress  Abd: soft, mild diffuse soreness/ND.  Extrem: WWP, no edema, SCDs in place        LABS:                        10.4   8.28  )-----------( 243      ( 05 Feb 2023 05:52 )             32.5     02-05    138  |  103  |  16  ----------------------------<  252<H>  4.1   |  25  |  0.91    Ca    8.9      05 Feb 2023 05:52  Phos  3.4     02-05  Mg     2.1     02-05    TPro  6.7  /  Alb  3.8  /  TBili  0.2  /  DBili  x   /  AST  15  /  ALT  12  /  AlkPhos  68  02-05          RADIOLOGY & ADDITIONAL STUDIES:

## 2023-02-05 NOTE — PROGRESS NOTE ADULT - PROBLEM SELECTOR PLAN 7
Plan:  F: s/p 2L NS in the ED   E: replete K<4, Mg<2  N: consistent carb, transition to CLD tomorrow  VTE Prophylaxis: None, i/s/o GIB  C: Full Code  D: 7La Plan:  F: s/p 2L NS in the ED   E: replete K<4, Mg<2  N: CLD, NPO after MN for scope  VTE Prophylaxis: None, i/s/o GIB  C: Full Code  D: TIM

## 2023-02-05 NOTE — PROGRESS NOTE ADULT - PROBLEM SELECTOR PLAN 2
On admission, presented with /89. On home lisinopril 20mg qd, toprol 50mg qd. Per surescripts patient was on amlodipine 5mg qd but states that she stopped taking the medication  Plan:  - resume metoprolol succinate 50mg qd  - c/w home lisinopril 20mg qd  - restarting amlodipine 5mg qd 2/5/23  - monitor blood pressure, hydralazine 5mg IVP PRN for sBP >170 persistently On admission, presented with /89. On home lisinopril 20mg qd, toprol 50mg qd. Per surescripts patient was on amlodipine 5mg qd but states that she stopped taking the medication  Plan:  - resume metoprolol succinate 50mg qd  - c/w home lisinopril 20mg qd  - restarted amlodipine 5mg qd 2/5/23  - monitor blood pressure, hydralazine 5mg IVP PRN for sBP >170 persistently

## 2023-02-05 NOTE — PROGRESS NOTE ADULT - ASSESSMENT
82-year-old F with PMHx of HTH, HLD, T2DM, prolapsed uterus s/p elective hystrectomy (09/12/2022, [facility]), L carotid bruit, thyroid nodule, gestational internal hemorrhoids, chronic constipation and prior cardiac arrhythmia (irregular) not on AC, presented with BRBPR and syncopal event, admitted to medicine for further workup.

## 2023-02-05 NOTE — PROGRESS NOTE ADULT - PROBLEM SELECTOR PLAN 3
History of constipation.  - monitor for BM  - bowel regimen History of constipation.   - monitor for BM  - bowel regimen, receiving Gol[facility] 2/5/23 for planned scope

## 2023-02-05 NOTE — PROGRESS NOTE ADULT - PROBLEM SELECTOR PLAN 1
Reports rectal bleeding while traveling home on [employer]. Patient reports last colonoscopy was 3 years ago in which she underwent polypectomy and was advised they were benign. On admission, AGGIE with soft maroon colored stool in rectal vault, negative for external hemorrhoids, no masses/hemorrhoids palpated.   - two large bore IVs  - CBCs BID  - pantoprazole 40mg IV BID  - consuted surgery, no acute interventions   - consulted GI, nonurgent colonoscopy/endoscopy plan for Monday Reports rectal bleeding while traveling home on [employer]. Patient reports last colonoscopy was 3 years ago in which she underwent polypectomy and was advised they were benign. On admission, AGGIE with soft maroon colored stool in rectal vault, negative for external hemorrhoids, no masses/hemorrhoids palpated. Surgery consulted, no acute interventions.   Plan:  - Two large bore IVs, CBCs BID  - Pantoprazole 40mg IV BID  - consulted GI, nonurgent colonoscopy/endoscopy plan for Monday 2/6/23  - NPO after m/n

## 2023-02-05 NOTE — PROGRESS NOTE ADULT - ASSESSMENT
82-year-old Spiritism female with PMHx of HTH, HLD, T2DM, prolapsed uterus s/p elective hystrectomy (09/12/2022, [facility]), L carotid bruit, thyroid nodule, tinea pedia, gestational hemorrhoids, chronic constipation and cardiac arrhythmia (irregular) is consulted to General Surgery for evaluation of bleeding per rectum.    PLAN  - Agree with GI consult recommendations for colonoscopy  - Recommend CBC Q6H  - Recommend PPI if UGI suspected  - Surgery Team 4C will continue to follow  - Please page Team 4 at [phone] with any questions and/or clinical changes

## 2023-02-05 NOTE — PROGRESS NOTE ADULT - PROBLEM SELECTOR PLAN 2
On admission, presented with /89. On home lisinopril 20mg qd, toprol 50mg qd. Per surescripts patient was on amlodipine 5mg qd but states that she stopped taking the medication  - resume metoprolol succinate 50mg qd  - c/w home lisinopril 20mg qd  - gave additional lisinopril 20mg x1 today  - consider starting amlodipine  - monitor blood pressure, hydralazine 5mg IVP PRN for sBP >170 persistently On admission, presented with /89. On home lisinopril 20mg qd, toprol 50mg qd. Per surescripts patient was on amlodipine 5mg qd but states that she stopped taking the medication  Plan:  - resume metoprolol succinate 50mg qd  - c/w home lisinopril 20mg qd  - restarting amlodipine 5mg qd 2/5/23  - monitor blood pressure, hydralazine 5mg IVP PRN for sBP >170 persistently

## 2023-02-05 NOTE — CHART NOTE - NSCHARTNOTEFT_GEN_A_CORE
Hgb slightly downtrend though overall remains stable without clinical GIB.    Recommendations:  -Clear liquids today, golytely prep later this afternoon into evening  -NPO at midnight for tentative colonoscopy 2/6  -Please repeat COVID19 test    Rehan Whitaker DO  Gastroenterology Fellow  Pager: [phone]

## 2023-02-05 NOTE — PROGRESS NOTE ADULT - SUBJECTIVE AND OBJECTIVE BOX
************** Transfer from TELE to [facility] ****************    Hospital Course: 82-year-old female with PMHx of HTH, HLD, T2DM, prolapsed uterus s/p elective hystrectomy (09/12/2022, [facility]), L carotid bruit, thyroid nodule, gestational internal hemorrhoids, chronic constipation and prior cardiac arrhythmia (irregular) not on AC, presented with BRBPR and reported vasovagal syncopal event. AGGIE on admission with maroon colored blood. For BRBPR, GI consulted. Patient started on protonix 40mg IV BID, plan for colonoscopy/endoscopy on 2/6. Patient is [employer] and doesnt not accept blood transfusions at this time. Hospital course c/b HTN. Initially patients home antihypertensives held given GIB. Resumed home lisinopril 20mg and toprol 50mg however patient remained hypertensive. Home lisinopril was increased to 40mg qd and she was started on amlodipine 5mg which was previously a home medication, however patient self-discontinued. Pt reports no new BMs on 2/5, Hgb at baseline 10.4, pt stable for stepdown on [facility].     OVERNIGHT EVENTS: OLIVER as patient step down from Tele    SUBJECTIVE / INTERVAL HPI: Patient seen and examined at bedside.     VITAL SIGNS:  Vital Signs Last 24 Hrs  T(C): 36.2 (05 Feb 2023 09:50), Max: 37 (04 Feb 2023 16:10)  T(F): 97.2 (05 Feb 2023 09:50), Max: 98.6 (04 Feb 2023 16:10)  HR: 70 (05 Feb 2023 12:00) (66 - 84)  BP: 164/67 (05 Feb 2023 12:00) (146/65 - 189/79)  BP(mean): 97 (05 Feb 2023 12:00) (94 - 114)  RR: 18 (05 Feb 2023 12:00) (18 - 19)  SpO2: 100% (05 Feb 2023 12:00) (98% - 100%)    Parameters below as of 05 Feb 2023 12:00  Patient On (Oxygen Delivery Method): room air        PHYSICAL EXAM:    General: NAD  HEENT: NC/AT; PERRL, anicteric sclera; MMM  Neck: supple w/o palpable nodularity  Cardiovascular: +S1/S2; RRR  Respiratory: CTA B/L; no W/R/R  Gastrointestinal: soft, NT, distended, tympanic on percussion; +BSx4  Extremities: WWP; no edema, clubbing or cyanosis  Vascular: 2+ radial, DP/PT pulses B/L  Neurological: AAOx3; no focal deficits    MEDICATIONS:  MEDICATIONS  (STANDING):  dextrose 5%. 1000 milliLiter(s) (50 mL/Hr) IV Continuous <Continuous>  dextrose 5%. 1000 milliLiter(s) (100 mL/Hr) IV Continuous <Continuous>  dextrose 50% Injectable 25 Gram(s) IV Push once  dextrose 50% Injectable 12.5 Gram(s) IV Push once  dextrose 50% Injectable 25 Gram(s) IV Push once  glucagon  Injectable 1 milliGRAM(s) IntraMuscular once  insulin glargine Injectable (LANTUS) 10 Unit(s) SubCutaneous at bedtime  insulin lispro (ADMELOG) corrective regimen sliding scale   SubCutaneous Before meals and at bedtime  lisinopril 40 milliGRAM(s) Oral every 24 hours  metoprolol succinate ER 50 milliGRAM(s) Oral daily  pantoprazole  Injectable 40 milliGRAM(s) IV Push every 12 hours  polyethylene glycol/electrolyte Solution. 4000 milliLiter(s) Oral once    MEDICATIONS  (PRN):  acetaminophen     Tablet .. 650 milliGRAM(s) Oral every 6 hours PRN Temp greater or equal to 38C (100.4F), Mild Pain (1 - 3)  dextrose Oral Gel 15 Gram(s) Oral once PRN Blood Glucose LESS THAN 70 milliGRAM(s)/deciliter      ALLERGIES:  Allergies    No Known Allergies    Intolerances        LABS:                        10.4   8.28  )-----------( 243      ( 05 Feb 2023 05:52 )             32.5     02-05    138  |  103  |  16  ----------------------------<  252<H>  4.1   |  25  |  0.91    Ca    8.9      05 Feb 2023 05:52  Phos  3.4     02-05  Mg     2.1     02-05    TPro  6.7  /  Alb  3.8  /  TBili  0.2  /  DBili  x   /  AST  15  /  ALT  12  /  AlkPhos  68  02-05        CAPILLARY BLOOD GLUCOSE      POCT Blood Glucose.: 208 mg/dL (05 Feb 2023 11:33)      RADIOLOGY & ADDITIONAL TESTS: Reviewed.

## 2023-02-05 NOTE — PROGRESS NOTE ADULT - ATTENDING COMMENTS
Patient is an 82 year old woman, Oriental orthodox, history of HTN, HLD, DM, prolapsed uterus s/p elective hysterectomy 2022, hemorrhoids, chronic constipation now admitted to medicine with lower GI bleed. At time of evaluation, afebrile, hemodynamically stable, abdomen soft, nontender, nondistended, HgB stable, imaging reviewed revealing extensive diverticulosis. Recommend GI consultation for possible endoscopy, serial CBCs, will continue to follow. No acute surgical intervention at this time. Late entry, date of service 2/4/23 .
She is agreeable to colonoscopy.   No further bleeding, suspect self limited diverticular source.  agree with detail recc as above.
83 y/o F Taoist with DM2 p/w vasovagal syncopal event with GIB initially on tele now stable for RMF  -colonscopy/endoscopy tomorrow with GI  -hg stable - regardless does not accept blood transfusions per Sikh  -denies further episodes of bleeding  -HTN - BPs uncontrolled so home meds continued  -knee pain related to syncopal event - f/u xray read  -Dm2 with hyperglycemia - BS above goal today but patient will be NPO for scope so will not adjust insulin at this time

## 2023-02-05 NOTE — PROGRESS NOTE ADULT - PROBLEM SELECTOR PLAN 6
Takes Januvia 100mg qd and Lantus 30U once a day, prescribed to take at bedtime. Patient reports taking lantus sometimes in AM and sometimes in PM.  - obtain A1C  - on moderate iSS  - start lantus 10U tonight given patient was on full diet today  - monitor FS Takes Januvia 100mg qd and Lantus 30U once a day, prescribed to take at bedtime. Patient reports taking lantus sometimes in AM and sometimes in PM.  - obtain A1C  - on moderate iSS  - start lantus 10U   - monitor FS

## 2023-02-05 NOTE — PROGRESS NOTE ADULT - PROBLEM SELECTOR PLAN 4
Patient has had R knee pain since vasosagal episode while on [employer]. Denies recent trauma. Patient has full ROM however pain with ambulating.  - will obtain knee XR to r/o fracture Patient has had R knee pain since vasosagal episode while on [employer]. Denies recent trauma. Patient has full ROM however pain with ambulating.  - obtained knee XR to r/o fracture, pending read

## 2023-02-05 NOTE — PROGRESS NOTE ADULT - PROBLEM SELECTOR PLAN 4
Patient has had R knee pain since vasosagal episode while on [employer]. Denies recent trauma. Patient has full ROM however pain with ambulating.  - will obtain knee XR to r/o fracture

## 2023-02-05 NOTE — PROGRESS NOTE ADULT - PROBLEM SELECTOR PLAN 3
History of constipation.   - monitor for BM  - bowel regimen, receiving Golitely 2/5/23 for planned scope

## 2023-02-05 NOTE — PROGRESS NOTE ADULT - SUBJECTIVE AND OBJECTIVE BOX
CC: Patient is a 82y old  Female who presents with a chief complaint of BRBPR     HOSPITAL COURSE:   82-year-old female with PMHx of HTH, HLD, T2DM, prolapsed uterus s/p elective hystrectomy (09/12/2022, [facility]), L carotid bruit, thyroid nodule, gestational internal hemorrhoids, chronic constipation and prior cardiac arrhythmia (irregular) not on AC, presented with BRBPR and reported vasovagal syncopal event. AGGIE on admission with maroon colored blood. For BRBPR, GI consulted. Patient started on protonix 40mg IV BID, plan for colonoscopy/endoscopy on 2/6. Patient is Syn[employer] and doesnt not accept blood transfusions at this time. Hospital course c/b HTN. Initially patients home antihypertensives held given GIB. Resumed home lisinopril 20mg and toprol 50mg however patient remained hypertensive. Home lisinopril was increased to 40mg qd and she was started on amlodipine 5mg which was previously a home medication, however patient self-discontinued.     INTERVAL EVENTS: OLIVER  SUBJECTIVE / INTERVAL HPI: Patient seen and examined at bedside.   ROS: negative unless otherwise stated above.    VITAL SIGNS:  Vital Signs Last 24 Hrs  T(C): 36.3 (05 Feb 2023 01:23), Max: 37 (04 Feb 2023 16:10)  T(F): 97.3 (05 Feb 2023 01:23), Max: 98.6 (04 Feb 2023 16:10)  HR: 80 (05 Feb 2023 04:14) (66 - 84)  BP: 146/65 (05 Feb 2023 04:14) (146/65 - 190/82)  BP(mean): 94 (05 Feb 2023 04:14) (94 - 118)  RR: 19 (05 Feb 2023 04:14) (18 - 20)  SpO2: 98% (05 Feb 2023 04:14) (95% - 100%)    Parameters below as of 05 Feb 2023 04:14  Patient On (Oxygen Delivery Method): room air          02-03-23 @ 07:01  -  02-04-23 @ 07:00  --------------------------------------------------------  IN: 0 mL / OUT: 2000 mL / NET: -2000 mL        PHYSICAL EXAM:    General: NAD  HEENT: MMM  Neck: supple  Cardiovascular: +S1/S2; RRR  Respiratory: CTA B/L; no W/R/R  Gastrointestinal: soft, NT/ND  Extremities: WWP; no edema, clubbing or cyanosis  Vascular: 2+ radial, DP/PT pulses B/L  Neurological: AAOx3; no focal deficits    MEDICATIONS:  MEDICATIONS  (STANDING):  amLODIPine   Tablet 5 milliGRAM(s) Oral every 24 hours  dextrose 5%. 1000 milliLiter(s) (50 mL/Hr) IV Continuous <Continuous>  dextrose 5%. 1000 milliLiter(s) (100 mL/Hr) IV Continuous <Continuous>  dextrose 50% Injectable 25 Gram(s) IV Push once  dextrose 50% Injectable 12.5 Gram(s) IV Push once  dextrose 50% Injectable 25 Gram(s) IV Push once  glucagon  Injectable 1 milliGRAM(s) IntraMuscular once  insulin glargine Injectable (LANTUS) 10 Unit(s) SubCutaneous at bedtime  insulin lispro (ADMELOG) corrective regimen sliding scale   SubCutaneous Before meals and at bedtime  lisinopril 40 milliGRAM(s) Oral every 24 hours  metoprolol succinate ER 50 milliGRAM(s) Oral daily  pantoprazole  Injectable 40 milliGRAM(s) IV Push every 12 hours    MEDICATIONS  (PRN):  acetaminophen     Tablet .. 650 milliGRAM(s) Oral every 6 hours PRN Temp greater or equal to 38C (100.4F), Mild Pain (1 - 3)  dextrose Oral Gel 15 Gram(s) Oral once PRN Blood Glucose LESS THAN 70 milliGRAM(s)/deciliter      ALLERGIES:  Allergies    No Known Allergies    Intolerances        LABS:                        10.4   8.28  )-----------( 243      ( 05 Feb 2023 05:52 )             32.5     02-05    138  |  103  |  x   ----------------------------<  x   4.1   |  x   |  x     Ca    9.3      04 Feb 2023 07:33  Phos  3.4     02-05  Mg     2.1     02-05    TPro  6.8  /  Alb  3.6  /  TBili  0.4  /  DBili  x   /  AST  19  /  ALT  13  /  AlkPhos  74  02-04    PT/INR - ( 03 Feb 2023 08:10 )   PT: 13.2 sec;   INR: 1.11          PTT - ( 03 Feb 2023 08:10 )  PTT:28.9 sec    CAPILLARY BLOOD GLUCOSE      POCT Blood Glucose.: 280 mg/dL (04 Feb 2023 22:22)      RADIOLOGY & ADDITIONAL TESTS: Reviewed. CC: Patient is a 82y old  Female who presents with a chief complaint of BRBPR     HOSPITAL COURSE:   82-year-old female with PMHx of HTH, HLD, T2DM, prolapsed uterus s/p elective hystrectomy (09/12/2022, [facility]), L carotid bruit, thyroid nodule, gestational internal hemorrhoids, chronic constipation and prior cardiac arrhythmia (irregular) not on AC, presented with BRBPR and reported vasovagal syncopal event. AGGIE on admission with maroon colored blood. For BRBPR, GI consulted. Patient started on protonix 40mg IV BID, plan for colonoscopy/endoscopy on 2/6. Patient is L[employer]-day and doesnt not accept blood transfusions at this time. Hospital course c/b HTN. Initially patients home antihypertensives held given GIB. Resumed home lisinopril 20mg and toprol 50mg however patient remained hypertensive. Home lisinopril was increased to 40mg qd and she was started on amlodipine 5mg which was previously a home medication, however patient self-discontinued. Pt reports no new BMs on 2/5, Hgb at baseline 10.4, pt stable for stepdown on RMF.     INTERVAL EVENTS: OLIVER  SUBJECTIVE / INTERVAL HPI: Patient seen and examined at bedside. Pt comfortable, says she is not having any BMs and is constipated. Pending C-Scope, Golitely for tonight.   ROS: negative unless otherwise stated above.    VITAL SIGNS:  Vital Signs Last 24 Hrs  T(C): 36.3 (05 Feb 2023 01:23), Max: 37 (04 Feb 2023 16:10)  T(F): 97.3 (05 Feb 2023 01:23), Max: 98.6 (04 Feb 2023 16:10)  HR: 80 (05 Feb 2023 04:14) (66 - 84)  BP: 146/65 (05 Feb 2023 04:14) (146/65 - 190/82)  BP(mean): 94 (05 Feb 2023 04:14) (94 - 118)  RR: 19 (05 Feb 2023 04:14) (18 - 20)  SpO2: 98% (05 Feb 2023 04:14) (95% - 100%)    Parameters below as of 05 Feb 2023 04:14  Patient On (Oxygen Delivery Method): room air          02-03-23 @ 07:01  -  02-04-23 @ 07:00  --------------------------------------------------------  IN: 0 mL / OUT: 2000 mL / NET: -2000 mL        PHYSICAL EXAM:  General: Alert and oriented x 3. No acute distress. Well-nourished.  Eyes: EOMI. Anicteric.  HEENT: Moist mucous membranes. No scleral icterus. No cervical lymphadenopathy.  Lungs: Clear to auscultation bilaterally. No accessory muscle use.  Cardiovascular: Regular rate and rhythm. No murmur. No JVD.  Abdomen: Soft, non-tender and + distended. No palpable masses. Hypertympanic to percussion.   Extremities: No edema. Non-tender.  Skin: No rashes or lesions. Warm.  Neurologic: No focal neurological deficits. CN II-XII grossly intact, but not individually tested.  Psychiatric: Cooperative. Appropriate mood and affect.      MEDICATIONS:  MEDICATIONS  (STANDING):  amLODIPine   Tablet 5 milliGRAM(s) Oral every 24 hours  dextrose 5%. 1000 milliLiter(s) (50 mL/Hr) IV Continuous <Continuous>  dextrose 5%. 1000 milliLiter(s) (100 mL/Hr) IV Continuous <Continuous>  dextrose 50% Injectable 25 Gram(s) IV Push once  dextrose 50% Injectable 12.5 Gram(s) IV Push once  dextrose 50% Injectable 25 Gram(s) IV Push once  glucagon  Injectable 1 milliGRAM(s) IntraMuscular once  insulin glargine Injectable (LANTUS) 10 Unit(s) SubCutaneous at bedtime  insulin lispro (ADMELOG) corrective regimen sliding scale   SubCutaneous Before meals and at bedtime  lisinopril 40 milliGRAM(s) Oral every 24 hours  metoprolol succinate ER 50 milliGRAM(s) Oral daily  pantoprazole  Injectable 40 milliGRAM(s) IV Push every 12 hours    MEDICATIONS  (PRN):  acetaminophen     Tablet .. 650 milliGRAM(s) Oral every 6 hours PRN Temp greater or equal to 38C (100.4F), Mild Pain (1 - 3)  dextrose Oral Gel 15 Gram(s) Oral once PRN Blood Glucose LESS THAN 70 milliGRAM(s)/deciliter      ALLERGIES:  Allergies    No Known Allergies    Intolerances        LABS:                        10.4   8.28  )-----------( 243      ( 05 Feb 2023 05:52 )             32.5     02-05    138  |  103  |  x   ----------------------------<  x   4.1   |  x   |  x     Ca    9.3      04 Feb 2023 07:33  Phos  3.4     02-05  Mg     2.1     02-05    TPro  6.8  /  Alb  3.6  /  TBili  0.4  /  DBili  x   /  AST  19  /  ALT  13  /  AlkPhos  74  02-04    PT/INR - ( 03 Feb 2023 08:10 )   PT: 13.2 sec;   INR: 1.11          PTT - ( 03 Feb 2023 08:10 )  PTT:28.9 sec    CAPILLARY BLOOD GLUCOSE      POCT Blood Glucose.: 280 mg/dL (04 Feb 2023 22:22)      RADIOLOGY & ADDITIONAL TESTS: Reviewed.

## 2023-02-06 ENCOUNTER — TRANSCRIPTION ENCOUNTER (OUTPATIENT)
Age: 83
End: 2023-02-06

## 2023-02-06 VITALS
SYSTOLIC BLOOD PRESSURE: 163 MMHG | HEART RATE: 68 BPM | OXYGEN SATURATION: 96 % | DIASTOLIC BLOOD PRESSURE: 75 MMHG | RESPIRATION RATE: 18 BRPM | TEMPERATURE: 98 F

## 2023-02-06 PROBLEM — E11.9 TYPE 2 DIABETES MELLITUS WITHOUT COMPLICATIONS: Chronic | Status: ACTIVE | Noted: 2023-02-03

## 2023-02-06 PROBLEM — N81.4 UTEROVAGINAL PROLAPSE, UNSPECIFIED: Chronic | Status: ACTIVE | Noted: 2023-02-03

## 2023-02-06 PROBLEM — E78.5 HYPERLIPIDEMIA, UNSPECIFIED: Chronic | Status: ACTIVE | Noted: 2023-02-03

## 2023-02-06 PROBLEM — E04.1 NONTOXIC SINGLE THYROID NODULE: Chronic | Status: ACTIVE | Noted: 2023-02-03

## 2023-02-06 LAB
ALBUMIN SERPL ELPH-MCNC: 3.8 G/DL — SIGNIFICANT CHANGE UP (ref 3.3–5)
ALP SERPL-CCNC: 72 U/L — SIGNIFICANT CHANGE UP (ref 40–120)
ALT FLD-CCNC: 14 U/L — SIGNIFICANT CHANGE UP (ref 10–45)
ANION GAP SERPL CALC-SCNC: 11 MMOL/L — SIGNIFICANT CHANGE UP (ref 5–17)
APTT BLD: 30.5 SEC — SIGNIFICANT CHANGE UP (ref 27.5–35.5)
AST SERPL-CCNC: 16 U/L — SIGNIFICANT CHANGE UP (ref 10–40)
BASOPHILS # BLD AUTO: 0.03 K/UL — SIGNIFICANT CHANGE UP (ref 0–0.2)
BASOPHILS NFR BLD AUTO: 0.5 % — SIGNIFICANT CHANGE UP (ref 0–2)
BILIRUB SERPL-MCNC: 0.3 MG/DL — SIGNIFICANT CHANGE UP (ref 0.2–1.2)
BLD GP AB SCN SERPL QL: NEGATIVE — SIGNIFICANT CHANGE UP
BUN SERPL-MCNC: 11 MG/DL — SIGNIFICANT CHANGE UP (ref 7–23)
CALCIUM SERPL-MCNC: 9.3 MG/DL — SIGNIFICANT CHANGE UP (ref 8.4–10.5)
CHLORIDE SERPL-SCNC: 102 MMOL/L — SIGNIFICANT CHANGE UP (ref 96–108)
CO2 SERPL-SCNC: 29 MMOL/L — SIGNIFICANT CHANGE UP (ref 22–31)
CREAT SERPL-MCNC: 0.86 MG/DL — SIGNIFICANT CHANGE UP (ref 0.5–1.3)
EGFR: 68 ML/MIN/1.73M2 — SIGNIFICANT CHANGE UP
EOSINOPHIL # BLD AUTO: 0.3 K/UL — SIGNIFICANT CHANGE UP (ref 0–0.5)
EOSINOPHIL NFR BLD AUTO: 4.8 % — SIGNIFICANT CHANGE UP (ref 0–6)
GLUCOSE BLDC GLUCOMTR-MCNC: 145 MG/DL — HIGH (ref 70–99)
GLUCOSE BLDC GLUCOMTR-MCNC: 151 MG/DL — HIGH (ref 70–99)
GLUCOSE BLDC GLUCOMTR-MCNC: 323 MG/DL — HIGH (ref 70–99)
GLUCOSE SERPL-MCNC: 189 MG/DL — HIGH (ref 70–99)
HCT VFR BLD CALC: 33.1 % — LOW (ref 34.5–45)
HGB BLD-MCNC: 10.6 G/DL — LOW (ref 11.5–15.5)
IMM GRANULOCYTES NFR BLD AUTO: 0.2 % — SIGNIFICANT CHANGE UP (ref 0–0.9)
INR BLD: 1.07 — SIGNIFICANT CHANGE UP (ref 0.88–1.16)
LYMPHOCYTES # BLD AUTO: 2.52 K/UL — SIGNIFICANT CHANGE UP (ref 1–3.3)
LYMPHOCYTES # BLD AUTO: 40.4 % — SIGNIFICANT CHANGE UP (ref 13–44)
MAGNESIUM SERPL-MCNC: 1.8 MG/DL — SIGNIFICANT CHANGE UP (ref 1.6–2.6)
MCHC RBC-ENTMCNC: 28.2 PG — SIGNIFICANT CHANGE UP (ref 27–34)
MCHC RBC-ENTMCNC: 32 GM/DL — SIGNIFICANT CHANGE UP (ref 32–36)
MCV RBC AUTO: 88 FL — SIGNIFICANT CHANGE UP (ref 80–100)
MONOCYTES # BLD AUTO: 0.51 K/UL — SIGNIFICANT CHANGE UP (ref 0–0.9)
MONOCYTES NFR BLD AUTO: 8.2 % — SIGNIFICANT CHANGE UP (ref 2–14)
NEUTROPHILS # BLD AUTO: 2.86 K/UL — SIGNIFICANT CHANGE UP (ref 1.8–7.4)
NEUTROPHILS NFR BLD AUTO: 45.9 % — SIGNIFICANT CHANGE UP (ref 43–77)
NRBC # BLD: 0 /100 WBCS — SIGNIFICANT CHANGE UP (ref 0–0)
PHOSPHATE SERPL-MCNC: 3.3 MG/DL — SIGNIFICANT CHANGE UP (ref 2.5–4.5)
PLATELET # BLD AUTO: 239 K/UL — SIGNIFICANT CHANGE UP (ref 150–400)
POTASSIUM SERPL-MCNC: 4.1 MMOL/L — SIGNIFICANT CHANGE UP (ref 3.5–5.3)
POTASSIUM SERPL-SCNC: 4.1 MMOL/L — SIGNIFICANT CHANGE UP (ref 3.5–5.3)
PROT SERPL-MCNC: 7.1 G/DL — SIGNIFICANT CHANGE UP (ref 6–8.3)
PROTHROM AB SERPL-ACNC: 12.8 SEC — SIGNIFICANT CHANGE UP (ref 10.5–13.4)
RBC # BLD: 3.76 M/UL — LOW (ref 3.8–5.2)
RBC # FLD: 14.3 % — SIGNIFICANT CHANGE UP (ref 10.3–14.5)
RH IG SCN BLD-IMP: POSITIVE — SIGNIFICANT CHANGE UP
SODIUM SERPL-SCNC: 142 MMOL/L — SIGNIFICANT CHANGE UP (ref 135–145)
WBC # BLD: 6.23 K/UL — SIGNIFICANT CHANGE UP (ref 3.8–10.5)
WBC # FLD AUTO: 6.23 K/UL — SIGNIFICANT CHANGE UP (ref 3.8–10.5)

## 2023-02-06 PROCEDURE — 83735 ASSAY OF MAGNESIUM: CPT

## 2023-02-06 PROCEDURE — 86901 BLOOD TYPING SEROLOGIC RH(D): CPT

## 2023-02-06 PROCEDURE — 82272 OCCULT BLD FECES 1-3 TESTS: CPT

## 2023-02-06 PROCEDURE — 84443 ASSAY THYROID STIM HORMONE: CPT

## 2023-02-06 PROCEDURE — 83550 IRON BINDING TEST: CPT

## 2023-02-06 PROCEDURE — 85045 AUTOMATED RETICULOCYTE COUNT: CPT

## 2023-02-06 PROCEDURE — 85610 PROTHROMBIN TIME: CPT

## 2023-02-06 PROCEDURE — 84484 ASSAY OF TROPONIN QUANT: CPT

## 2023-02-06 PROCEDURE — 83880 ASSAY OF NATRIURETIC PEPTIDE: CPT

## 2023-02-06 PROCEDURE — 85730 THROMBOPLASTIN TIME PARTIAL: CPT

## 2023-02-06 PROCEDURE — 80053 COMPREHEN METABOLIC PANEL: CPT

## 2023-02-06 PROCEDURE — 97530 THERAPEUTIC ACTIVITIES: CPT

## 2023-02-06 PROCEDURE — 82668 ASSAY OF ERYTHROPOIETIN: CPT

## 2023-02-06 PROCEDURE — 87635 SARS-COV-2 COVID-19 AMP PRB: CPT

## 2023-02-06 PROCEDURE — 83605 ASSAY OF LACTIC ACID: CPT

## 2023-02-06 PROCEDURE — 74174 CTA ABD&PLVS W/CONTRAST: CPT

## 2023-02-06 PROCEDURE — 82962 GLUCOSE BLOOD TEST: CPT

## 2023-02-06 PROCEDURE — 84100 ASSAY OF PHOSPHORUS: CPT

## 2023-02-06 PROCEDURE — 73562 X-RAY EXAM OF KNEE 3: CPT

## 2023-02-06 PROCEDURE — 83540 ASSAY OF IRON: CPT

## 2023-02-06 PROCEDURE — 84466 ASSAY OF TRANSFERRIN: CPT

## 2023-02-06 PROCEDURE — 81001 URINALYSIS AUTO W/SCOPE: CPT

## 2023-02-06 PROCEDURE — 82728 ASSAY OF FERRITIN: CPT

## 2023-02-06 PROCEDURE — 85027 COMPLETE CBC AUTOMATED: CPT

## 2023-02-06 PROCEDURE — 99231 SBSQ HOSP IP/OBS SF/LOW 25: CPT

## 2023-02-06 PROCEDURE — 99291 CRITICAL CARE FIRST HOUR: CPT

## 2023-02-06 PROCEDURE — 82550 ASSAY OF CK (CPK): CPT

## 2023-02-06 PROCEDURE — 45378 DIAGNOSTIC COLONOSCOPY: CPT

## 2023-02-06 PROCEDURE — 99239 HOSP IP/OBS DSCHRG MGMT >30: CPT | Mod: GC

## 2023-02-06 PROCEDURE — 96374 THER/PROPH/DIAG INJ IV PUSH: CPT

## 2023-02-06 PROCEDURE — 87086 URINE CULTURE/COLONY COUNT: CPT

## 2023-02-06 PROCEDURE — 83036 HEMOGLOBIN GLYCOSYLATED A1C: CPT

## 2023-02-06 PROCEDURE — 97162 PT EVAL MOD COMPLEX 30 MIN: CPT

## 2023-02-06 PROCEDURE — 86850 RBC ANTIBODY SCREEN: CPT

## 2023-02-06 PROCEDURE — 85025 COMPLETE CBC W/AUTO DIFF WBC: CPT

## 2023-02-06 PROCEDURE — 96375 TX/PRO/DX INJ NEW DRUG ADDON: CPT

## 2023-02-06 PROCEDURE — 97116 GAIT TRAINING THERAPY: CPT

## 2023-02-06 PROCEDURE — 36415 COLL VENOUS BLD VENIPUNCTURE: CPT

## 2023-02-06 PROCEDURE — 86900 BLOOD TYPING SEROLOGIC ABO: CPT

## 2023-02-06 PROCEDURE — 71045 X-RAY EXAM CHEST 1 VIEW: CPT

## 2023-02-06 RX ORDER — LISINOPRIL 2.5 MG/1
1 TABLET ORAL
Qty: 30 | Refills: 1
Start: 2023-02-06 | End: 2023-04-06

## 2023-02-06 RX ORDER — AMLODIPINE BESYLATE 2.5 MG/1
1 TABLET ORAL
Qty: 30 | Refills: 1
Start: 2023-02-06 | End: 2023-04-06

## 2023-02-06 RX ORDER — LISINOPRIL 2.5 MG/1
20 TABLET ORAL
Qty: 0 | Refills: 0 | DISCHARGE

## 2023-02-06 RX ADMIN — PANTOPRAZOLE SODIUM 40 MILLIGRAM(S): 20 TABLET, DELAYED RELEASE ORAL at 06:25

## 2023-02-06 RX ADMIN — Medication 8: at 13:25

## 2023-02-06 RX ADMIN — LISINOPRIL 40 MILLIGRAM(S): 2.5 TABLET ORAL at 06:26

## 2023-02-06 RX ADMIN — Medication 50 MILLIGRAM(S): at 06:26

## 2023-02-06 RX ADMIN — AMLODIPINE BESYLATE 10 MILLIGRAM(S): 2.5 TABLET ORAL at 06:26

## 2023-02-06 NOTE — PROGRESS NOTE ADULT - PROBLEM SELECTOR PLAN 5
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 PT, Tylenol PRN, outpatient Ortho f/u

## 2023-02-06 NOTE — PRE-ANESTHESIA EVALUATION ADULT - NSATTENDATTESTRD_GEN_ALL_CORE
Pregnant, LMP 08/5 presents with cramping since she found out she was pregnant. However pink spotting noted when she wipes since last night, denies passing clots.   
The patient has been re-examined and I agree with the above assessment or I updated with my findings.

## 2023-02-06 NOTE — CHART NOTE - NSCHARTNOTEFT_GEN_A_CORE
Colonoscopy performed with attending, Dr. Yanes.    Findings as noted:  -Stool was seen in the colon limiting visualization of mucosal surface in sigmoid, cecum and ascending colon	  -Multiple non-bleeding diverticula were seen in the whole colon.	  -No stigmata of active or recent bleeding throughout colon.    Recommendations:  -High fiber diet education  -Repeat CBC, BMP this AM  -Recommend outpt follow up to discuss complete screening colonoscopy given stool burden limiting adequate visualization of sub cm lesions.    If Hgb stable/uptrending this AM, no further recommendations from GI perspective.    Please request that the patient schedule GI follow-up at the clinic located on the fourth floor of [facility] [address] by calling the office at [phone]     Rehan Whitaker DO  Gastroenterology Fellow  Pager: [phone] Colonoscopy performed with attending, Dr. Yanes.    Findings as noted:  -Stool was seen in the colon limiting visualization of mucosal surface in sigmoid, cecum and ascending colon	  -Multiple non-bleeding diverticula were seen in the whole colon.	  -No stigmata of active or recent bleeding throughout colon.    Recommendations:  -High fiber diet education. May resume diet today.  -Repeat CBC, BMP this AM  -Recommend outpt follow up to discuss complete screening colonoscopy given stool burden limiting adequate visualization of sub cm lesions.    If Hgb stable/uptrending this AM, no further recommendations from GI perspective.    Please request that the patient schedule GI follow-up at the clinic located on the fourth floor of [facility] [address] by calling the office at [phone]     Rehan Whitaker DO  Gastroenterology Fellow  Pager: [phone]

## 2023-02-06 NOTE — DISCHARGE NOTE PROVIDER - CARE PROVIDERS DIRECT ADDRESSES
,DirectAddress_Unknown ,DirectAddress_Unknown,[email or web address] ,DirectAddress_Unknown,[email or web address],DirectAddress_Unknown

## 2023-02-06 NOTE — DISCHARGE NOTE NURSING/CASE MANAGEMENT/SOCIAL WORK - NSDCPEFALRISK_GEN_ALL_CORE
For information on Fall & Injury Prevention, visit: [email or web address] OR  [email or web address] OR  https://www.cdc.gov/steadi/patient.html
77.1

## 2023-02-06 NOTE — PROGRESS NOTE ADULT - PROBLEM SELECTOR PLAN 4
Patient has had R knee pain since vasosagal episode while on [employer]. Denies recent trauma. Patient has full ROM however pain with ambulating.  - obtained knee XR to r/o fracture: preliminary read: no acute fracture.

## 2023-02-06 NOTE — PROGRESS NOTE ADULT - SUBJECTIVE AND OBJECTIVE BOX
24 hr events: OLIVER    SUBJECTIVE: Patient seen at bedside in no acute distress. Completed bowel prep overnight w/o any bloody BM's. No CP, SOB, fevers or chills.     Vital Signs Last 24 Hrs  T(C): 36.6 (06 Feb 2023 07:31), Max: 37.1 (05 Feb 2023 14:30)  T(F): 97.8 (06 Feb 2023 05:04), Max: 98.7 (05 Feb 2023 14:30)  HR: 81 (06 Feb 2023 07:31) (70 - 81)  BP: 171/80 (06 Feb 2023 07:31) (142/65 - 171/80)  BP(mean): 94 (06 Feb 2023 07:31) (94 - 97)  RR: 19 (06 Feb 2023 07:31) (18 - 19)  SpO2: 98% (06 Feb 2023 07:31) (96% - 100%)    Parameters below as of 06 Feb 2023 05:04  Patient On (Oxygen Delivery Method): room air        Physical Exam:  General: NAD  Pulmonary: Nonlabored breathing, no respiratory distress  Cardiovascular: NSR  Abdominal: soft, NT/ND, no organomegaly  Extremities: WWP, SCDs in place    Lines/drains/tubes:    I&O's Summary      LABS:                        10.4   8.28  )-----------( 243      ( 05 Feb 2023 05:52 )             32.5     02-05    138  |  103  |  16  ----------------------------<  252<H>  4.1   |  25  |  0.91    Ca    8.9      05 Feb 2023 05:52  Phos  3.4     02-05  Mg     2.1     02-05    TPro  6.7  /  Alb  3.8  /  TBili  0.2  /  DBili  x   /  AST  15  /  ALT  12  /  AlkPhos  68  02-05        CAPILLARY BLOOD GLUCOSE      POCT Blood Glucose.: 145 mg/dL (06 Feb 2023 09:53)  POCT Blood Glucose.: 151 mg/dL (06 Feb 2023 06:39)  POCT Blood Glucose.: 176 mg/dL (05 Feb 2023 21:34)  POCT Blood Glucose.: 223 mg/dL (05 Feb 2023 17:27)  POCT Blood Glucose.: 208 mg/dL (05 Feb 2023 11:33)    LIVER FUNCTIONS - ( 05 Feb 2023 05:52 )  Alb: 3.8 g/dL / Pro: 6.7 g/dL / ALK PHOS: 68 U/L / ALT: 12 U/L / AST: 15 U/L / GGT: x             RADIOLOGY & ADDITIONAL STUDIES:

## 2023-02-06 NOTE — DISCHARGE NOTE PROVIDER - NSDCCPCAREPLAN_GEN_ALL_CORE_FT
PRINCIPAL DISCHARGE DIAGNOSIS  Diagnosis: Upper GI bleed  Assessment and Plan of Treatment: Gastrointestinal (GI) bleeding is a symptom of a disorder in your digestive tract. The blood often appears in stool or vomit but isn't always visible, though it may cause the stool to look black or tarry. The level of bleeding can range from mild to severe and life-threatening. You had a   Be sure to follow up with your primary care physician & gastroenterologist.      SECONDARY DISCHARGE DIAGNOSES  Diagnosis: Type 2 diabetes mellitus  Assessment and Plan of Treatment: Diabetes mellitus refers to a group of diseases that affect how your body uses blood sugar (glucose). Glucose is vital to your health because it's an important source of energy for the cells that make up your muscles and tissues. It's also your brain's main source of fuel. The underlying cause of diabetes varies by type. But, no matter what type of diabetes you have, it can lead to excess sugar in your blood. Too much sugar in your blood can lead to serious health problems. Chronic diabetes conditions include type 1 diabetes (generally diagnosed in childhood) and type 2 diabetes (a disease in adults, can be associated with increased weight and diet). Some symptoms to watch out for with high blood sugar are nausea, vomiting, abdominal pain, increased thirst or urination, blurred vision, fatigue. Diabetes is generally diagnosed via a blood test called Hemoglobin a1c. It represents a marker of glucose in the blood. A value over 6.5% diagnoses the disease. Your Hemoglobin a1c was over 9%        Diagnosis: Hypertension  Assessment and Plan of Treatment:      PRINCIPAL DISCHARGE DIAGNOSIS  Diagnosis: GI bleed  Assessment and Plan of Treatment: Gastrointestinal (GI) bleeding is a symptom of a disorder in your digestive tract. The blood often appears in stool or vomit but isn't always visible, though it may cause the stool to look black or tarry. The level of bleeding can range from mild to severe and life-threatening. Be sure to follow up with your primary care physician & gastroenterologist. You had a colonoscopy this admission, it was limited due to stool, but no large active bleeds were found. You should follow up with GI outpatient to schedule a full colonoscopy- you have an appointment made. You are on a waitlist for an earlier appointment with Dr. Guzman, they will contact you if an opening occurs- please take it      SECONDARY DISCHARGE DIAGNOSES  Diagnosis: Type 2 diabetes mellitus  Assessment and Plan of Treatment: Diabetes mellitus refers to a group of diseases that affect how your body uses blood sugar (glucose). Glucose is vital to your health because it's an important source of energy for the cells that make up your muscles and tissues. It's also your brain's main source of fuel. The underlying cause of diabetes varies by type. But, no matter what type of diabetes you have, it can lead to excess sugar in your blood. Too much sugar in your blood can lead to serious health problems. Chronic diabetes conditions include type 1 diabetes (generally diagnosed in childhood) and type 2 diabetes (a disease in adults, can be associated with increased weight and diet). Some symptoms to watch out for with high blood sugar are nausea, vomiting, abdominal pain, increased thirst or urination, blurred vision, fatigue. Diabetes is generally diagnosed via a blood test called Hemoglobin a1c. It represents a marker of glucose in the blood. A value over 6.5% diagnoses the disease. Your Hemoglobin a1c was over 9% , which is very high. We are discharging you on your home regimen of insulin- it is important that you take it consistently and not skip doses. You should follow up closely with your PCP regarding this as well. You are on a waitlist for an earlier appointment with Dr. Guzman, they will contact you if an opening occurs- please take it.      Diagnosis: Hypertension  Assessment and Plan of Treatment: - Take your blood pressure medication as prescribed. Do not skip doses. If you forgot or you skipped a dose talk to your doctor for advise on when to take the next dose   - Check your blood pressure every day with a cuff. Write down the values and present them to your doctor at the next visit. If the values are persistently high your medication may need to be re-adjusted   - Avoid excessive salt in your diet as salt help the build-up of excessive fluid in your body which can increase the blood pressure   - maintain a healthy weight and diet. being overweight is a rsk factor for hypertension and losing weight can lower your blood pressure   - Avoid smoking and excessive alcohol intake   - call 911 or report to the emergency department if your blood pressure is very high and you have severe headache, blurry vision, weakness or numbness, slurred speech, vertigo or chest pain.  We are discharging you on an increased dose of your LISINOPRIL (40mg from 20mg).   You are on a waitlist for an earlier appointment with Dr. Guzman, they will contact you if an opening occurs- please take it.    Diagnosis: Diverticulosis  Assessment and Plan of Treatment: Diverticulosis is a condition that causes small pockets called diverticula to form in your intestine. These pockets make it difficult for bowel movements to pass through your digestive system.  Diverticulosis usually does not cause any signs or symptoms. It may cause any of the following in some people: pain or discomfort in your lower abdomen, abdominal bloating, constipation or diarrhea.  To help with diverticulosis:   Eat a variety of high-fiber foods for you to have regular bowel movements. High-fiber foods include cooked beans, fruits, vegetables, and some cereals. Most adults need 25 to 35 grams of fiber each day. You may have abdominal discomfort, bloating, and gas if you add fiber to your diet too quickly. You may need to take a fiber supplement if you are not getting enough fiber from   Medicines to soften your bowel movements may be given. You may also need medicines to treat symptoms such as bloating and pain.  Exercise regularly. Get 30 minutes of exercise on most days of the week. Maintain a healthy weight and stay well hydrated.   Do not smoke. Nicotine and other chemicals in cigarettes increase your risk for diverticulitis.     PRINCIPAL DISCHARGE DIAGNOSIS  Diagnosis: GI bleed  Assessment and Plan of Treatment: Gastrointestinal (GI) bleeding is a symptom of a disorder in your digestive tract. The blood often appears in stool or vomit but isn't always visible, though it may cause the stool to look black or tarry. The level of bleeding can range from mild to severe and life-threatening. Be sure to follow up with your primary care physician & gastroenterologist. You had a colonoscopy this admission, it was limited due to stool, but no large active bleeds were found. You should follow up with GI outpatient to schedule a full colonoscopy- you have an appointment made. You are on a waitlist for an earlier appointment with Dr. Guzman, they will contact you if an opening occurs- please take it      SECONDARY DISCHARGE DIAGNOSES  Diagnosis: Type 2 diabetes mellitus  Assessment and Plan of Treatment: Diabetes mellitus refers to a group of diseases that affect how your body uses blood sugar (glucose). Glucose is vital to your health because it's an important source of energy for the cells that make up your muscles and tissues. It's also your brain's main source of fuel. The underlying cause of diabetes varies by type. But, no matter what type of diabetes you have, it can lead to excess sugar in your blood. Too much sugar in your blood can lead to serious health problems. Chronic diabetes conditions include type 1 diabetes (generally diagnosed in childhood) and type 2 diabetes (a disease in adults, can be associated with increased weight and diet). Some symptoms to watch out for with high blood sugar are nausea, vomiting, abdominal pain, increased thirst or urination, blurred vision, fatigue. Diabetes is generally diagnosed via a blood test called Hemoglobin a1c. It represents a marker of glucose in the blood. A value over 6.5% diagnoses the disease. Your Hemoglobin a1c was over 9% , which is very high. We are discharging you on your home regimen of insulin- it is important that you take it consistently and not skip doses. You should follow up closely with your PCP regarding this as well. You are on a waitlist for an earlier appointment with Dr. Guzman, they will contact you if an opening occurs- please take it.      Diagnosis: Hypertension  Assessment and Plan of Treatment: - Take your blood pressure medication as prescribed. Do not skip doses. If you forgot or you skipped a dose talk to your doctor for advise on when to take the next dose   - Check your blood pressure every day with a cuff. Write down the values and present them to your doctor at the next visit. If the values are persistently high your medication may need to be re-adjusted   - Avoid excessive salt in your diet as salt help the build-up of excessive fluid in your body which can increase the blood pressure   - maintain a healthy weight and diet. being overweight is a rsk factor for hypertension and losing weight can lower your blood pressure   - Avoid smoking and excessive alcohol intake   - call 911 or report to the emergency department if your blood pressure is very high and you have severe headache, blurry vision, weakness or numbness, slurred speech, vertigo or chest pain.  We are discharging you on an increased dose of your LISINOPRIL (40mg from 20mg).   You are on a waitlist for an earlier appointment with Dr. Guzman, they will contact you if an opening occurs- please take it.    Diagnosis: Diverticulosis  Assessment and Plan of Treatment: Diverticulosis is a condition that causes small pockets called diverticula to form in your intestine. These pockets make it difficult for bowel movements to pass through your digestive system.  Diverticulosis usually does not cause any signs or symptoms. It may cause any of the following in some people: pain or discomfort in your lower abdomen, abdominal bloating, constipation or diarrhea.  To help with diverticulosis:   Eat a variety of high-fiber foods for you to have regular bowel movements. High-fiber foods include cooked beans, fruits, vegetables, and some cereals. Most adults need 25 to 35 grams of fiber each day. You may have abdominal discomfort, bloating, and gas if you add fiber to your diet too quickly. You may need to take a fiber supplement if you are not getting enough fiber from   Medicines to soften your bowel movements may be given. You may also need medicines to treat symptoms such as bloating and pain.  Exercise regularly. Get 30 minutes of exercise on most days of the week. Maintain a healthy weight and stay well hydrated.   Do not smoke. Nicotine and other chemicals in cigarettes increase your risk for diverticulitis.    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 No fracture. No   dislocation.You had an xray of you leg due to knee and leg pain after the fall. It did not show acute fracture however it showed bone infarcts within the distal femur and proximal tibia. This will not likely improve with physical therapy. You should follow up on these results with your PCP.     PRINCIPAL DISCHARGE DIAGNOSIS  Diagnosis: GI bleed  Assessment and Plan of Treatment: Gastrointestinal (GI) bleeding is a symptom of a disorder in your digestive tract. The blood often appears in stool or vomit but isn't always visible, though it may cause the stool to look black or tarry. The level of bleeding can range from mild to severe and life-threatening. Be sure to follow up with your primary care physician & gastroenterologist. You had a colonoscopy this admission, it was limited due to stool, but no large active bleeds were found. You should follow up with GI outpatient to schedule a full colonoscopy- you have an appointment made. You are on a waitlist for an earlier appointment with Dr. Guzamn, they will contact you if an opening occurs- please take it      SECONDARY DISCHARGE DIAGNOSES  Diagnosis: Type 2 diabetes mellitus  Assessment and Plan of Treatment: Diabetes mellitus refers to a group of diseases that affect how your body uses blood sugar (glucose). Glucose is vital to your health because it's an important source of energy for the cells that make up your muscles and tissues. It's also your brain's main source of fuel. The underlying cause of diabetes varies by type. But, no matter what type of diabetes you have, it can lead to excess sugar in your blood. Too much sugar in your blood can lead to serious health problems. Chronic diabetes conditions include type 1 diabetes (generally diagnosed in childhood) and type 2 diabetes (a disease in adults, can be associated with increased weight and diet). Some symptoms to watch out for with high blood sugar are nausea, vomiting, abdominal pain, increased thirst or urination, blurred vision, fatigue. Diabetes is generally diagnosed via a blood test called Hemoglobin a1c. It represents a marker of glucose in the blood. A value over 6.5% diagnoses the disease. Your Hemoglobin a1c was over 9% , which is very high. We are discharging you on your home regimen of insulin- it is important that you take it consistently and not skip doses. You should follow up closely with your PCP regarding this as well. You are on a waitlist for an earlier appointment with Dr. Guzman, they will contact you if an opening occurs- please take it.      Diagnosis: Hypertension  Assessment and Plan of Treatment: - Take your blood pressure medication as prescribed. Do not skip doses. If you forgot or you skipped a dose talk to your doctor for advise on when to take the next dose   - Check your blood pressure every day with a cuff. Write down the values and present them to your doctor at the next visit. If the values are persistently high your medication may need to be re-adjusted   - Avoid excessive salt in your diet as salt help the build-up of excessive fluid in your body which can increase the blood pressure   - maintain a healthy weight and diet. being overweight is a rsk factor for hypertension and losing weight can lower your blood pressure   - Avoid smoking and excessive alcohol intake   - call 911 or report to the emergency department if your blood pressure is very high and you have severe headache, blurry vision, weakness or numbness, slurred speech, vertigo or chest pain.  We are discharging you on an increased dose of your LISINOPRIL (40mg from 20mg).   You are on a waitlist for an earlier appointment with Dr. Guzman, they will contact you if an opening occurs- please take it.    Diagnosis: Diverticulosis  Assessment and Plan of Treatment: Diverticulosis is a condition that causes small pockets called diverticula to form in your intestine. These pockets make it difficult for bowel movements to pass through your digestive system.  Diverticulosis usually does not cause any signs or symptoms. It may cause any of the following in some people: pain or discomfort in your lower abdomen, abdominal bloating, constipation or diarrhea.  To help with diverticulosis:   Eat a variety of high-fiber foods for you to have regular bowel movements. High-fiber foods include cooked beans, fruits, vegetables, and some cereals. Most adults need 25 to 35 grams of fiber each day. You may have abdominal discomfort, bloating, and gas if you add fiber to your diet too quickly. You may need to take a fiber supplement if you are not getting enough fiber from   Medicines to soften your bowel movements may be given. You may also need medicines to treat symptoms such as bloating and pain.  Exercise regularly. Get 30 minutes of exercise on most days of the week. Maintain a healthy weight and stay well hydrated.   Do not smoke. Nicotine and other chemicals in cigarettes increase your risk for diverticulitis.    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 No fracture. No   dislocation.You had an xray of you leg due to knee and leg pain after the fall. It did not show acute fracture however it showed bone infarcts within the distal femur and proximal tibia. This will not likely improve with physical therapy. You should follow up on these results outpatient with an orthopedist. We have provided the information for one of our providers; you may call to make an appointment or use someone else.

## 2023-02-06 NOTE — DISCHARGE NOTE NURSING/CASE MANAGEMENT/SOCIAL WORK - PATIENT PORTAL LINK FT
You can access the FollowMyHealth Patient Portal offered by [facility] by registering at the following website: [email or web address]. By joining [facility]’s FollowMyHealth portal, you will also be able to view your health information using other applications (apps) compatible with our system.

## 2023-02-06 NOTE — PROGRESS NOTE ADULT - PROBLEM SELECTOR PLAN 1
BRBPR resolved; s/p colonoscopy today, which showed multiple non-bleeding diverticula in the entire colon, but no signs of active or recent bleeding; Hb stable; no need for further inpatient work-up; Pt. advised to eat a high-fiber diet; outpatient GI f/u arranged for 2/13 at 10:30am

## 2023-02-06 NOTE — DISCHARGE NOTE PROVIDER - NSDCMRMEDTOKEN_GEN_ALL_CORE_FT
Januvia 100 mg oral tablet: 1 tab(s) orally once a day  Lantus 100 units/mL subcutaneous solution: 30 unit(s) subcutaneous once a day  metoprolol succinate 50 mg oral tablet, extended release: 1 tab(s) orally once a day   amLODIPine 10 mg oral tablet: 1 tab(s) orally every 24 hours  Januvia 100 mg oral tablet: 1 tab(s) orally once a day  Lantus 100 units/mL subcutaneous solution: 30 unit(s) subcutaneous once a day  lisinopril 40 mg oral tablet: 1 tab(s) orally every 24 hours  metoprolol succinate 50 mg oral tablet, extended release: 1 tab(s) orally once a day

## 2023-02-06 NOTE — DISCHARGE NOTE PROVIDER - PROVIDER TOKENS
PROVIDER:[TOKEN:[[id number]],SCHEDULEDAPPT:[02/13/2023],SCHEDULEDAPPTTIME:[03:30 PM]] PROVIDER:[TOKEN:[[id number]],SCHEDULEDAPPT:[02/13/2023],SCHEDULEDAPPTTIME:[03:30 PM]],PROVIDER:[TOKEN:[[id number]],SCHEDULEDAPPT:[03/27/2023],SCHEDULEDAPPTTIME:[10:15 AM],ESTABLISHEDPATIENT:[T]] PROVIDER:[TOKEN:[[id number]],SCHEDULEDAPPT:[02/13/2023],SCHEDULEDAPPTTIME:[03:30 PM]],PROVIDER:[TOKEN:[[id number]],SCHEDULEDAPPT:[03/27/2023],SCHEDULEDAPPTTIME:[10:15 AM],ESTABLISHEDPATIENT:[T]],FREE:[LAST:[[facility]],FIRST:[[place] [facility]],PHONE:[phone]],FAX:[(   )    -],ADDRESS:[[address] [place] [address]],FOLLOWUP:[1 month]] PROVIDER:[TOKEN:[[id number]],SCHEDULEDAPPT:[02/13/2023],SCHEDULEDAPPTTIME:[10:30 AM]],PROVIDER:[TOKEN:[[id number]],SCHEDULEDAPPT:[03/27/2023],SCHEDULEDAPPTTIME:[10:15 AM],ESTABLISHEDPATIENT:[T]],FREE:[LAST:[[facility]],FIRST:[[place] at [facility]],PHONE:[phone]],FAX:[(   )    -],ADDRESS:[[address] [place] [address]],FOLLOWUP:[1 month]]

## 2023-02-06 NOTE — PROGRESS NOTE ADULT - PROBLEM SELECTOR PLAN 1
Reports rectal bleeding while traveling home on [employer]. Patient reports last colonoscopy was 3 years ago in which she underwent polypectomy and was advised they were benign. On admission, AGGIE with soft maroon colored stool in rectal vault, negative for external hemorrhoids, no masses/hemorrhoids palpated. Surgery consulted, no acute interventions.   Plan:  - Two large bore IVs, CBCs BID  - Pantoprazole 40mg IV BID  - f/u  nonurgent colonoscopy/endoscopy results 2/6/23

## 2023-02-06 NOTE — PROGRESS NOTE ADULT - ASSESSMENT
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    PLAN  - s/p colonoscopy w/ GI this AM: Multiple non-bleeding diverticula were seen in the entire colon. No signs of active or recent bleeding throughout colon.  - Continue to trend CBC  - Surgery Team 4C will continue to follow  - Please page Team 4 at [phone] with any questions and/or clinical nix

## 2023-02-06 NOTE — DISCHARGE NOTE PROVIDER - NSDCFUADDAPPT_GEN_ALL_CORE_FT
Please bring your Insurance card, Photo ID and Discharge paperwork to the following appointment:    (1) Please follow up with your Gastroenterology Provider, Dr. Jose Varela at [address] [place] [address], [address] Floor, [place] [address] on 02/13/2023 at 10:30am.    Appointment was scheduled by Ms. ABHISHEK Rdz, Referral Coordinator.

## 2023-02-06 NOTE — DISCHARGE NOTE PROVIDER - NSDCFUSCHEDAPPT_GEN_ALL_CORE_FT
[facility]  INTMED [address] [facility]  Scheduled Appointment: 03/27/2023     [facility]  GASTRO [address]  Scheduled Appointment: 02/13/2023    [facility]  INTMED [address] [facility]  Scheduled Appointment: 03/27/2023

## 2023-02-06 NOTE — PROGRESS NOTE ADULT - TIME BILLING
coordination of care  d/c planning  d/w Medicine residents
evaluation and management of lower GI bleed, review of labs and imaging, discussion with consultants, documentation.

## 2023-02-06 NOTE — PROGRESS NOTE ADULT - PROBLEM SELECTOR PLAN 6
Takes Januvia 100mg qd and Lantus 30U once a day, prescribed to take at bedtime. Patient reports taking lantus sometimes in AM and sometimes in PM.  - A1c 9.1  - will defer titrating insulin regimen given patient currently on clears to be NPO for scope  - on moderate iSS  - start lantus 10U   - monitor FS

## 2023-02-06 NOTE — PROGRESS NOTE ADULT - SUBJECTIVE AND OBJECTIVE BOX
Patient is a 82y old  Female who presents with a chief complaint of BRBPR (06 Feb 2023 09:59)      INTERVAL HPI/OVERNIGHT EVENTS:    Pt. seen and examined earlier today  Pt. feels well, has no complaints  colonoscopy results reviewed w/ Pt.; Pt. verbalized plan to eat a high-fiber diet and to f/u w/ her PCP  Pt. denies F/C, CP, SOB, lightheadedness, BRBPR, melena, abdominal pain    Review of Systems: 12 point review of systems otherwise negative    MEDICATIONS  (STANDING):  amLODIPine   Tablet 10 milliGRAM(s) Oral every 24 hours  dextrose 5%. 1000 milliLiter(s) (50 mL/Hr) IV Continuous <Continuous>  dextrose 5%. 1000 milliLiter(s) (100 mL/Hr) IV Continuous <Continuous>  dextrose 50% Injectable 25 Gram(s) IV Push once  dextrose 50% Injectable 12.5 Gram(s) IV Push once  dextrose 50% Injectable 25 Gram(s) IV Push once  glucagon  Injectable 1 milliGRAM(s) IntraMuscular once  influenza  Vaccine (HIGH DOSE) 0.7 milliLiter(s) IntraMuscular once  insulin glargine Injectable (LANTUS) 10 Unit(s) SubCutaneous at bedtime  insulin lispro (ADMELOG) corrective regimen sliding scale   SubCutaneous Before meals and at bedtime  lisinopril 40 milliGRAM(s) Oral every 24 hours  metoprolol succinate ER 50 milliGRAM(s) Oral daily  pantoprazole  Injectable 40 milliGRAM(s) IV Push every 12 hours    MEDICATIONS  (PRN):  acetaminophen     Tablet .. 650 milliGRAM(s) Oral every 6 hours PRN Temp greater or equal to 38C (100.4F), Mild Pain (1 - 3)  dextrose Oral Gel 15 Gram(s) Oral once PRN Blood Glucose LESS THAN 70 milliGRAM(s)/deciliter      Allergies    No Known Allergies    Intolerances          Vital Signs Last 24 Hrs  T(C): 36.6 (06 Feb 2023 07:31), Max: 37.1 (05 Feb 2023 14:30)  T(F): 97.8 (06 Feb 2023 05:04), Max: 98.7 (05 Feb 2023 14:30)  HR: 81 (06 Feb 2023 07:31) (73 - 81)  BP: 171/80 (06 Feb 2023 07:31) (142/65 - 171/80)  BP(mean): 94 (06 Feb 2023 07:31) (94 - 94)  RR: 19 (06 Feb 2023 07:31) (18 - 19)  SpO2: 98% (06 Feb 2023 07:31) (96% - 98%)    Parameters below as of 06 Feb 2023 05:04  Patient On (Oxygen Delivery Method): room air      CAPILLARY BLOOD GLUCOSE      POCT Blood Glucose.: 323 mg/dL (06 Feb 2023 13:19)  POCT Blood Glucose.: 145 mg/dL (06 Feb 2023 09:53)  POCT Blood Glucose.: 151 mg/dL (06 Feb 2023 06:39)  POCT Blood Glucose.: 176 mg/dL (05 Feb 2023 21:34)  POCT Blood Glucose.: 223 mg/dL (05 Feb 2023 17:27)        Physical Exam:  (earlier today)  Daily Height in cm: 167.64 (06 Feb 2023 07:31)    Daily   General:  well-appearing in NAD  HEENT:  no pallor  CV:  RRR  Lungs:  CTA B/L  Abdomen:  soft NT ND  Extremities:  no edema B/L LE  Skin:  WWP  Neuro:  AAOx3    LABS:                        10.6   6.23  )-----------( 239      ( 06 Feb 2023 10:43 )             33.1     02-06    142  |  102  |  11  ----------------------------<  189<H>  4.1   |  29  |  0.86    Ca    9.3      06 Feb 2023 10:43  Phos  3.3     02-06  Mg     1.8     02-06    TPro  7.1  /  Alb  3.8  /  TBili  0.3  /  DBili  x   /  AST  16  /  ALT  14  /  AlkPhos  72  02-06    PT/INR - ( 06 Feb 2023 10:43 )   PT: 12.8 sec;   INR: 1.07          PTT - ( 06 Feb 2023 10:43 )  PTT:30.5 sec

## 2023-02-06 NOTE — PROGRESS NOTE ADULT - PROVIDER SPECIALTY LIST ADULT
Surgery
Internal Medicine
Surgery
Gastroenterology
Surgery
Internal Medicine
Hospitalist
Internal Medicine
Internal Medicine

## 2023-02-06 NOTE — PROGRESS NOTE ADULT - PROBLEM SELECTOR PLAN 7
Plan:  F: s/p 2L NS in the ED   E: replete K<4, Mg<2  N: CLD  VTE Prophylaxis: None, i/s/o GIB  C: Full Code  D: [facility]

## 2023-02-06 NOTE — DISCHARGE NOTE NURSING/CASE MANAGEMENT/SOCIAL WORK - NSDCFUADDAPPT_GEN_ALL_CORE_FT
Please bring your Insurance card, Photo ID and Discharge paperwork to the following appointment:    (1) Please follow up with your Gastroenterology Provider, Dr. Jose Varela at [address] [place] [address], [address] Floor, [place] [address] on 02/13/2023 at 10:30am.    Appointment was scheduled by Ms. ABHISHEK Rdz, Referral Coordinator.

## 2023-02-06 NOTE — PROGRESS NOTE ADULT - PROBLEM SELECTOR PLAN 2
HbA1c 9.5%, not at goal; monitor blood glucose; cont. insulin, adjust per DARRON; diabetic diet; [facility]; Pt. has outpatient Endocrinology f/u

## 2023-02-06 NOTE — PROGRESS NOTE ADULT - PROBLEM SELECTOR PLAN 2
On admission, presented with /89. On home lisinopril 20mg qd, toprol 50mg qd. Per surescripts patient was on amlodipine 5mg qd but states that she stopped taking the medication  Plan:  - resume metoprolol succinate 50mg qd  - c/w home lisinopril 20mg qd  - restarted amlodipine 5mg qd 2/5/23  - monitor blood pressure, hydralazine 5mg IVP PRN for sBP >170 persistently

## 2023-02-06 NOTE — PROGRESS NOTE ADULT - NSPROGADDITIONALINFOA_GEN_ALL_CORE
DVT ppx: SCDs; no A/C in setting of GI bleeding  Dispo: d/c home today w/ outpatient PCP and GI f/u 2/13 at 10:30am

## 2023-02-06 NOTE — DISCHARGE NOTE PROVIDER - HOSPITAL COURSE
82-year-old female with PMHx of HTH, HLD, T2DM, prolapsed uterus s/p elective hystrectomy (09/12/2022, [facility]), L carotid bruit, thyroid nodule, gestational internal hemorrhoids, chronic constipation and prior cardiac arrhythmia (irregular) not on AC, presented with BRBPR and reported vasovagal syncopal event. AGGIE on admission with maroon colored blood. For BRBPR, GI consulted. Patient started on protonix 40mg IV BID, plan for colonoscopy/endoscopy on 2/6. Patient is [place] and doesnt not accept blood transfusions at this time. Hospital course c/b HTN. Initially patients home antihypertensives held given GIB. Resumed home lisinopril 20mg and toprol 50mg however patient remained hypertensive. Home lisinopril was increased to 40mg qd and she was started on amlodipine 5mg which was previously a home medication, however patient self-discontinued. Pt reports no new BMs on 2/5, Hgb at baseline 10.4, stepped down to RMF. Patient underwent colonoscopy on 2/6 showing multiple non-bleeding diverticula in the whole colon and no stigmata of active or recent bleeding throughout colon. AM Hb remained stable; patient is stable for discharge with close PCP f/u for hypertension, diabetes control, and GI.    82-year-old F with PMHx of HTH, HLD, T2DM, prolapsed uterus s/p elective hystrectomy (09/12/2022, [facility]), L carotid bruit, thyroid nodule, gestational internal hemorrhoids, chronic constipation and prior cardiac arrhythmia (irregular) not on AC, presented with BRBPR and syncopal event, admitted to medicine for further workup.      Problem/Plan - 1:  ·  Problem: GIB (gastrointestinal bleeding).   ·  Plan: Reports rectal bleeding while traveling home on [employer]. Patient reports last colonoscopy was 3 years ago in which she underwent polypectomy and was advised they were benign. On admission, AGGIE with soft maroon colored stool in rectal vault, negative for external hemorrhoids, no masses/hemorrhoids palpated. Surgery consulted, no acute interventions. s/p colonoscopy showing multiple non-bleeding diverticula in the whole colon and no stigmata of active or recent bleeding throughout colon but limited visualization d/t stool burden.   Plan:  - Per GI will f/u at clinic to discuss complete screening colonoscopy  - High fiber diet education.    Problem/Plan - 2:  ·  Problem: HTN (hypertension).   ·  Plan: On admission, presented with /89. On home lisinopril 20mg qd, toprol 50mg qd. Per surescripts patient was on amlodipine 5mg qd but states that she stopped taking the medication  Plan:  - c/w metoprolol succinate 50mg qd  - c/w home lisinopril 40mg qd  - restarted amlodipine 5mg qd 2/5/23 however patient self discontinued.   - f/u outpatient PCP upon d/c    Problem/Plan - 3:  ·  Problem: Constipation.   ·  Plan: History of constipation.   - monitor for BM, gave golytely for scope  - f/u with PCP outpatient.     Problem/Plan - 4:  ·  Problem: Knee pain, right.   ·  Plan: Patient has had R knee pain since vasosagal episode while on [employer]. Denies recent trauma. Patient has full ROM however pain with ambulating.  - obtained knee XR to r/o fracture: preliminary read: no acute fracture.  - f/u outpatient with PCP.     Problem/Plan - 5:  ·  Problem: HLD (hyperlipidemia).   ·  Plan: Home meds: rosuvastatin 20mg qHS  - c/w rosuvastatin 20mg qHS.    Problem/Plan - 6:  ·  Problem: Type 2 diabetes mellitus.   ·  Plan: Takes Januvia 100mg qd and Lantus 30U once a day, prescribed to take at bedtime. Patient reports taking lantus sometimes in AM and sometimes in PM.  - A1c 9.1  - Diabetes education, will d/c with home insulin regimen.  - Close outpatient f/u on PCP.     New Medications: Increased Lisinopril to 40mg from 20.  Old Medications Held: None   f/u Labs: HbA1C, Blood pressure.             82-year-old female with PMHx of HTH, HLD, T2DM, prolapsed uterus s/p elective hystrectomy (09/12/2022, [facility]), L carotid bruit, thyroid nodule, gestational internal hemorrhoids, chronic constipation and prior cardiac arrhythmia (irregular) not on AC, presented with BRBPR and reported vasovagal syncopal event. AGGIE on admission with maroon colored blood. For BRBPR, GI consulted. Patient started on protonix 40mg IV BID, plan for colonoscopy/endoscopy on 2/6. Patient is [employer] and doesnt not accept blood transfusions at this time. Hospital course c/b HTN. Initially patients home antihypertensives held given GIB. Resumed home lisinopril 20mg and toprol 50mg however patient remained hypertensive. Home lisinopril was increased to 40mg qd and she was started on amlodipine 5mg which was previously a home medication, however patient self-discontinued. Pt reports no new BMs on 2/5, Hgb at baseline 10.4, stepped down to RMF. Patient underwent colonoscopy on 2/6 showing multiple non-bleeding diverticula in the whole colon and no stigmata of active or recent bleeding throughout colon. AM Hb remained stable; patient is stable for discharge with close PCP f/u for hypertension, diabetes control, and GI.    82-year-old F with PMHx of HTH, HLD, T2DM, prolapsed uterus s/p elective hystrectomy (09/12/2022, [facility]), L carotid bruit, thyroid nodule, gestational internal hemorrhoids, chronic constipation and prior cardiac arrhythmia (irregular) not on AC, presented with BRBPR and syncopal event, admitted to medicine for further workup.      Problem/Plan - 1:  ·  Problem: GIB (gastrointestinal bleeding).   ·  Plan: Reports rectal bleeding while traveling home on [employer]. Patient reports last colonoscopy was 3 years ago in which she underwent polypectomy and was advised they were benign. On admission, AGGIE with soft maroon colored stool in rectal vault, negative for external hemorrhoids, no masses/hemorrhoids palpated. Surgery consulted, no acute interventions. s/p colonoscopy showing multiple non-bleeding diverticula in the whole colon and no stigmata of active or recent bleeding throughout colon but limited visualization d/t stool burden. Hb remained stable (10.6 from 10.4).  Plan:  - Per GI will f/u at clinic to discuss complete screening colonoscopy  - High fiber diet education.    Problem/Plan - 2:  ·  Problem: HTN (hypertension).   ·  Plan: On admission, presented with /89. On home lisinopril 20mg qd, toprol 50mg qd. Per surescripts patient was on amlodipine 5mg qd but states that she stopped taking the medication  Plan:  - c/w metoprolol succinate 50mg qd  - c/w home lisinopril 40mg qd  - restarted amlodipine 5mg qd 2/5/23 however patient self discontinued.   - f/u outpatient PCP upon d/c    Problem/Plan - 3:  ·  Problem: Constipation.   ·  Plan: History of constipation.   - monitor for BM, gave golytely for scope  - f/u with PCP outpatient.     Problem/Plan - 4:  ·  Problem: Knee pain, right.   ·  Plan: Patient has had R knee pain since vasosagal episode while on [employer]. Denies recent trauma. Patient has full ROM however pain with ambulating.  - obtained knee XR to r/o fracture: preliminary read: no acute fracture.  - f/u outpatient with PCP.     Problem/Plan - 5:  ·  Problem: HLD (hyperlipidemia).   ·  Plan: Home meds: rosuvastatin 20mg qHS  - c/w rosuvastatin 20mg qHS.    Problem/Plan - 6:  ·  Problem: Type 2 diabetes mellitus.   ·  Plan: Takes Januvia 100mg qd and Lantus 30U once a day, prescribed to take at bedtime. Patient reports taking lantus sometimes in AM and sometimes in PM.  - A1c 9.1  - Diabetes education, will d/c with home insulin regimen.  - Close outpatient f/u on PCP.     New Medications: Increased Lisinopril to 40mg from 20.  Old Medications Held: None   f/u Labs: HbA1C, Blood pressure.

## 2023-02-06 NOTE — DISCHARGE NOTE PROVIDER - CARE PROVIDER_API CALL
Ma, Jose CHOW)  Internal Medicine  [address], [address]th [place] [address]  Phone: [phone]  Fax: [phone]  Scheduled Appointment: 02/13/2023 03:30 PM   Ma, Jose CHOW)  Internal Medicine  [address], [address]th [place] [address]  Phone: [phone]  Fax: [phone]  Scheduled Appointment: 02/13/2023 03:30 PM    Azra Guzman)  Internal Medicine  [address] [place] [address] [address]  [place], [place] [id number]  Phone: [phone]  Fax: [phone]  Established Patient  Scheduled Appointment: 03/27/2023 10:15 AM   Ma, Jose CHOW)  Internal Medicine  [address], [address]th [place] [address]  Phone: [phone]  Fax: [phone]  Scheduled Appointment: 02/13/2023 03:30 PM    Azra Guzman)  Internal Medicine  [address] [place] [address], [address]  [place], [place] [id number]  Phone: [phone]  Fax: [phone]  Established Patient  Scheduled Appointment: 03/27/2023 10:15 AM    [facility], [place] at [facility]  [address]  [address] [place] [address]  Phone: [phone]  Fax: (   )    -  Follow Up Time: 1 month   Ma, Jose CHOW)  Internal Medicine  [address], [address]th [place] [address]  Phone: [phone]  Fax: [phone]  Scheduled Appointment: 02/13/2023 10:30 AM    Azra Guzman)  Internal Medicine  [address] [place] [address], [address]  [place], [place] [id number]  Phone: [phone]  Fax: [phone]  Established Patient  Scheduled Appointment: 03/27/2023 10:15 AM    [facility], [place] at [facility]  [address]  [address] [place] [address]  Phone: [phone]  Fax: (   )    -  Follow Up Time: 1 month

## 2023-02-07 LAB — EPO SERPL-MCNC: 23.7 MIU/ML — HIGH (ref 2.6–18.5)

## 2023-02-09 ENCOUNTER — NON-APPOINTMENT (OUTPATIENT)
Age: 83
End: 2023-02-09

## 2023-02-10 DIAGNOSIS — E11.65 TYPE 2 DIABETES MELLITUS WITH HYPERGLYCEMIA: ICD-10-CM

## 2023-02-10 DIAGNOSIS — D62 ACUTE POSTHEMORRHAGIC ANEMIA: ICD-10-CM

## 2023-02-10 DIAGNOSIS — Z90.710 ACQUIRED ABSENCE OF BOTH CERVIX AND UTERUS: ICD-10-CM

## 2023-02-10 DIAGNOSIS — I49.9 CARDIAC ARRHYTHMIA, UNSPECIFIED: ICD-10-CM

## 2023-02-10 DIAGNOSIS — M87.9 OSTEONECROSIS, UNSPECIFIED: ICD-10-CM

## 2023-02-10 DIAGNOSIS — M25.561 PAIN IN RIGHT KNEE: ICD-10-CM

## 2023-02-10 DIAGNOSIS — Z87.891 PERSONAL HISTORY OF NICOTINE DEPENDENCE: ICD-10-CM

## 2023-02-10 DIAGNOSIS — R10.9 UNSPECIFIED ABDOMINAL PAIN: ICD-10-CM

## 2023-02-10 DIAGNOSIS — E87.20 ACIDOSIS, UNSPECIFIED: ICD-10-CM

## 2023-02-10 DIAGNOSIS — K59.09 OTHER CONSTIPATION: ICD-10-CM

## 2023-02-10 DIAGNOSIS — I10 ESSENTIAL (PRIMARY) HYPERTENSION: ICD-10-CM

## 2023-02-10 DIAGNOSIS — K57.31 DIVERTICULOSIS OF LARGE INTESTINE WITHOUT PERFORATION OR ABSCESS WITH BLEEDING: ICD-10-CM

## 2023-02-10 DIAGNOSIS — E78.5 HYPERLIPIDEMIA, UNSPECIFIED: ICD-10-CM

## 2023-02-10 DIAGNOSIS — R91.1 SOLITARY PULMONARY NODULE: ICD-10-CM

## 2023-03-20 ENCOUNTER — APPOINTMENT (OUTPATIENT)
Dept: GASTROENTEROLOGY | Facility: CLINIC | Age: 83
End: 2023-03-20

## 2023-03-27 ENCOUNTER — APPOINTMENT (OUTPATIENT)
Dept: INTERNAL MEDICINE | Facility: CLINIC | Age: 83
End: 2023-03-27
Payer: MEDICARE

## 2023-03-27 VITALS
OXYGEN SATURATION: 98 % | BODY MASS INDEX: 25.18 KG/M2 | HEART RATE: 74 BPM | DIASTOLIC BLOOD PRESSURE: 90 MMHG | SYSTOLIC BLOOD PRESSURE: 140 MMHG | WEIGHT: 156 LBS

## 2023-03-27 DIAGNOSIS — Z91.81 HISTORY OF FALLING: ICD-10-CM

## 2023-03-27 DIAGNOSIS — R21 RASH AND OTHER NONSPECIFIC SKIN ERUPTION: ICD-10-CM
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
## 2023-03-27 PROCEDURE — 99214 OFFICE O/P EST MOD 30 MIN: CPT | Mod: 25

## 2023-03-27 NOTE — HISTORY OF PRESENT ILLNESS
[FreeTextEntry1] : HTN [de-identified] : rec colonoscopy, DEXA, ECHO, US thyroid.  \par pt was hospitalized from 2/3-2/6/23 at [facility]- for GI bleed, sync[place] the train- reviewed hospital notes.  on dischg incr lisinopril 40 mg and restarted amlodipine 5 mg\par reviewed 2/6/23 colonoscopy- diverticula.  reviewed labs 2/6/23- hgb 10.6\par DM- pt is  compliant w  meds.  compliance w diet-  eating ice cream- once a wk. rec to Endo.  .  \par fasting glucose 140's\par ophth-01/22- will make f/u appt\par HTN- reviewed 2/3/22 Cardio notes, EKG 2/13/20 .  reviewed 2/26/20 ECHO- dilated LA, mild TR, MR.  home /75.  - rpt ECHO rec and f/u in 6 mo.  pt states she taking only lisinopril for BP - unclear if she is taking 20 mg or 40 mg\par Lipid-- on crestor- no myalgia. .  pt has not taken crestor\par murmur- rec ECHO\par thyroid nodule- rec US \par

## 2023-03-27 NOTE — PLAN
[FreeTextEntry1] : rec pneum , shingrix vac\par HTN- restart meds, incr lisinopril 40 daily\par Blood was collected in the office.\par in addition to 1 mo, also make CPE appt

## 2023-03-28 ENCOUNTER — NON-APPOINTMENT (OUTPATIENT)
Age: 83
End: 2023-03-28

## 2023-03-29 ENCOUNTER — NON-APPOINTMENT (OUTPATIENT)
Age: 83
End: 2023-03-29

## 2023-03-29 LAB
ALBUMIN SERPL ELPH-MCNC: 4.2 G/DL
ALP BLD-CCNC: 81 U/L
ALT SERPL-CCNC: 16 U/L
ANION GAP SERPL CALC-SCNC: 10 MMOL/L
AST SERPL-CCNC: 19 U/L
BASOPHILS # BLD AUTO: 0.04 K/UL
BASOPHILS NFR BLD AUTO: 0.7 %
BILIRUB SERPL-MCNC: 0.2 MG/DL
BUN SERPL-MCNC: 16 MG/DL
CALCIUM SERPL-MCNC: 9.8 MG/DL
CHLORIDE SERPL-SCNC: 101 MMOL/L
CO2 SERPL-SCNC: 27 MMOL/L
CREAT SERPL-MCNC: 0.96 MG/DL
EGFR: 60 ML/MIN/1.73M2
EOSINOPHIL # BLD AUTO: 0.23 K/UL
EOSINOPHIL NFR BLD AUTO: 3.9 %
ESTIMATED AVERAGE GLUCOSE: 200 MG/DL
FERRITIN SERPL-MCNC: 59 NG/ML
FRUCTOSAMINE SERPL-MCNC: 377 UMOL/L
GLUCOSE SERPL-MCNC: 201 MG/DL
HBA1C MFR BLD HPLC: 8.6 %
HCT VFR BLD CALC: 39.4 %
HGB BLD-MCNC: 12 G/DL
IMM GRANULOCYTES NFR BLD AUTO: 0.2 %
IRON SATN MFR SERPL: 26 %
IRON SERPL-MCNC: 85 UG/DL
LYMPHOCYTES # BLD AUTO: 2.2 K/UL
LYMPHOCYTES NFR BLD AUTO: 37.4 %
MAN DIFF?: NORMAL
MCHC RBC-ENTMCNC: 27.6 PG
MCHC RBC-ENTMCNC: 30.5 GM/DL
MCV RBC AUTO: 90.8 FL
MONOCYTES # BLD AUTO: 0.45 K/UL
MONOCYTES NFR BLD AUTO: 7.6 %
NEUTROPHILS # BLD AUTO: 2.96 K/UL
NEUTROPHILS NFR BLD AUTO: 50.2 %
PLATELET # BLD AUTO: 288 K/UL
POTASSIUM SERPL-SCNC: 4.7 MMOL/L
PROT SERPL-MCNC: 7.5 G/DL
RBC # BLD: 4.34 M/UL
RBC # FLD: 14.3 %
SODIUM SERPL-SCNC: 138 MMOL/L
TIBC SERPL-MCNC: 326 UG/DL
UIBC SERPL-MCNC: 241 UG/DL
WBC # FLD AUTO: 5.89 K/UL

## 2023-04-03 NOTE — ED ADULT NURSE NOTE - CAS EDN DISCHARGE ASSESSMENT
Sunscreen Recommendations: spf 30+ Detail Level: Generalized Detail Level: Zone Detail Level: Simple Sunscreen Recommendations: daily applications with spf 30+ Detail Level: Detailed Alert and oriented to person, place and time

## 2023-04-07 ENCOUNTER — APPOINTMENT (OUTPATIENT)
Dept: INTERNAL MEDICINE | Facility: CLINIC | Age: 83
End: 2023-04-07
Payer: MEDICARE

## 2023-04-07 PROCEDURE — 99213 OFFICE O/P EST LOW 20 MIN: CPT | Mod: 95

## 2023-04-14 ENCOUNTER — APPOINTMENT (OUTPATIENT)
Dept: INTERNAL MEDICINE | Facility: CLINIC | Age: 83
End: 2023-04-14
Payer: MEDICARE

## 2023-04-14 PROCEDURE — 99443: CPT

## 2023-04-24 ENCOUNTER — APPOINTMENT (OUTPATIENT)
Dept: INTERNAL MEDICINE | Facility: CLINIC | Age: 83
End: 2023-04-24

## 2023-05-08 ENCOUNTER — RX RENEWAL (OUTPATIENT)
Age: 83
End: 2023-05-08

## 2023-05-12 ENCOUNTER — NON-APPOINTMENT (OUTPATIENT)
Age: 83
End: 2023-05-12

## 2023-05-12 ENCOUNTER — APPOINTMENT (OUTPATIENT)
Dept: INTERNAL MEDICINE | Facility: CLINIC | Age: 83
End: 2023-05-12

## 2023-05-12 ENCOUNTER — APPOINTMENT (OUTPATIENT)
Dept: INTERNAL MEDICINE | Facility: CLINIC | Age: 83
End: 2023-05-12
Payer: MEDICARE

## 2023-05-12 VITALS
SYSTOLIC BLOOD PRESSURE: 180 MMHG | HEIGHT: 66 IN | OXYGEN SATURATION: 98 % | DIASTOLIC BLOOD PRESSURE: 100 MMHG | HEART RATE: 60 BPM

## 2023-05-12 VITALS — DIASTOLIC BLOOD PRESSURE: 71 MMHG | SYSTOLIC BLOOD PRESSURE: 190 MMHG

## 2023-05-12 DIAGNOSIS — Z87.09 PERSONAL HISTORY OF OTHER DISEASES OF THE RESPIRATORY SYSTEM: ICD-10-CM

## 2023-05-12 PROCEDURE — 99214 OFFICE O/P EST MOD 30 MIN: CPT | Mod: 25

## 2023-05-12 NOTE — PLAN
[FreeTextEntry1] : HTN- incr norvasc 10 mg\par Dm - incr basaglar 36 u\par hip/ leg pain - heat , diclofenac\par pt in a rush due to [employer] coming\par Blood was collected in the office.\par \par

## 2023-05-12 NOTE — HISTORY OF PRESENT ILLNESS
[FreeTextEntry1] : HTN [de-identified] : rec colonoscopy, DEXA, ECHO, US thyroid.  \par DM- pt is  compliant w  meds.  compliance w diet- stopped eating ice cream- rec to Endo.  . reviewed 3/29/23A1c 8.6\par fasting glucose 150-170\par ophth-01/22- will make f/u appt\par HTN- last visit incr lisinopril .  home /75.  - rpt ECHO rec and f/u in 6 mo.  home /80\par Lipid-- on crestor- no myalgia. . not yet started med\par murmur- rec ECHO\par thyroid nodule- rec US \par got COVID -4/2023- recovered\par c/o L hip pain- radiating down the L leg.  no weakness, numbness in the legs.  no urine or stool incont.   fall- after she dev the pain. - started 2 mo ago

## 2023-05-12 NOTE — PHYSICAL EXAM
[de-identified] : good ROM of back - no pain [de-identified] : good ROM of Hip-L- c/o pain over lateral hip , IT band [de-identified] : refused [TWNoteComboBox5] : +2 [TWNoteComboBox6] : +2

## 2023-05-13 ENCOUNTER — NON-APPOINTMENT (OUTPATIENT)
Age: 83
End: 2023-05-13

## 2023-05-15 ENCOUNTER — NON-APPOINTMENT (OUTPATIENT)
Age: 83
End: 2023-05-15

## 2023-05-15 LAB
ANION GAP SERPL CALC-SCNC: 11 MMOL/L
BUN SERPL-MCNC: 14 MG/DL
CALCIUM SERPL-MCNC: 10.2 MG/DL
CHLORIDE SERPL-SCNC: 101 MMOL/L
CO2 SERPL-SCNC: 26 MMOL/L
CREAT SERPL-MCNC: 1.04 MG/DL
EGFR: 54 ML/MIN/1.73M2
ESTIMATED AVERAGE GLUCOSE: 220 MG/DL
GLUCOSE SERPL-MCNC: 174 MG/DL
HBA1C MFR BLD HPLC: 9.3 %
POTASSIUM SERPL-SCNC: 4.3 MMOL/L
SODIUM SERPL-SCNC: 139 MMOL/L

## 2023-05-25 ENCOUNTER — APPOINTMENT (OUTPATIENT)
Dept: PHYSICAL MEDICINE AND REHAB | Facility: CLINIC | Age: 83
End: 2023-05-25

## 2023-06-05 ENCOUNTER — APPOINTMENT (OUTPATIENT)
Dept: PHYSICAL MEDICINE AND REHAB | Facility: CLINIC | Age: 83
End: 2023-06-05

## 2023-06-14 ENCOUNTER — APPOINTMENT (OUTPATIENT)
Dept: CARDIOLOGY | Facility: CLINIC | Age: 83
End: 2023-06-14

## 2023-07-25 ENCOUNTER — APPOINTMENT (OUTPATIENT)
Dept: INTERNAL MEDICINE | Facility: CLINIC | Age: 83
End: 2023-07-25
Payer: MEDICARE

## 2023-07-25 VITALS
OXYGEN SATURATION: 96 % | DIASTOLIC BLOOD PRESSURE: 74 MMHG | HEIGHT: 66 IN | BODY MASS INDEX: 24.43 KG/M2 | SYSTOLIC BLOOD PRESSURE: 139 MMHG | WEIGHT: 152 LBS | HEART RATE: 68 BPM

## 2023-07-25 DIAGNOSIS — M25.552 PAIN IN LEFT HIP: ICD-10-CM

## 2023-07-25 DIAGNOSIS — Z87.19 PERSONAL HISTORY OF OTHER DISEASES OF THE DIGESTIVE SYSTEM: ICD-10-CM

## 2023-07-25 DIAGNOSIS — M79.659 PAIN IN UNSPECIFIED THIGH: ICD-10-CM

## 2023-07-25 DIAGNOSIS — K92.2 GASTROINTESTINAL HEMORRHAGE, UNSPECIFIED: ICD-10-CM

## 2023-07-25 DIAGNOSIS — Z87.39 PERSONAL HISTORY OF OTHER DISEASES OF THE MUSCULOSKELETAL SYSTEM AND CONNECTIVE TISSUE: ICD-10-CM

## 2023-07-25 DIAGNOSIS — Z00.00 ENCOUNTER FOR GENERAL ADULT MEDICAL EXAMINATION W/OUT ABNORMAL FINDINGS: ICD-10-CM

## 2023-07-25 PROCEDURE — 99214 OFFICE O/P EST MOD 30 MIN: CPT | Mod: 25

## 2023-07-25 PROCEDURE — 93000 ELECTROCARDIOGRAM COMPLETE: CPT

## 2023-07-25 PROCEDURE — G0439: CPT

## 2023-07-25 RX ORDER — SITAGLIPTIN 100 MG/1
100 TABLET, FILM COATED ORAL
Qty: 90 | Refills: 0 | Status: DISCONTINUED | COMMUNITY
Start: 2022-03-24 | End: 2023-07-25

## 2023-07-25 RX ORDER — AMOXICILLIN 500 MG/1
500 CAPSULE ORAL TWICE DAILY
Qty: 20 | Refills: 0 | Status: DISCONTINUED | COMMUNITY
Start: 2023-04-14 | End: 2023-07-25

## 2023-07-25 RX ORDER — BENZONATATE 100 MG/1
100 CAPSULE ORAL
Qty: 10 | Refills: 0 | Status: DISCONTINUED | COMMUNITY
Start: 2023-04-14 | End: 2023-07-25

## 2023-07-25 NOTE — HISTORY OF PRESENT ILLNESS
[FreeTextEntry1] : CPE [de-identified] : vaccine- rec pneum 20, tdap, shingrix vac\par diet- healthy diet reviewed\par exercise- walking 1 mile/ day\par DM-last visit incr basaglar. but never started humalog 6 Units. She is  pt is  non compliant w  meds- not taking januvia.  compliance w diet- stopped eating ice cream- rec to Endo.  . reviewed 5/12/23 A1c 9.3\par fasting glucose \par ophth-01/22- will make f/u appt\par HTN- last visit incr norvasc .  .  - rpt ECHO rec and f/u in 6 mo.  home BP \par Lipid-- on crestor-  . not yet started med\par abn EKG, murmur- rec ECHO.  has appt w Cardio.  denies any CP\par thyroid nodule- rec US \par c/o L hip pain-resolved\par hx of GI bleed- had colonoscopy.  no further GI bleeding.  pt states she was constip at that time. never f/u w GI\par states she had partial hysterectomy 09/2022 for prolapse- also seen GYN 10/2022- as per pt, he did a breast exam- states she sees GYN yearly

## 2023-07-25 NOTE — PHYSICAL EXAM
[No Acute Distress] : no acute distress [Well Nourished] : well nourished [Well Developed] : well developed [Well-Appearing] : well-appearing [Normal Sclera/Conjunctiva] : normal sclera/conjunctiva [PERRL] : pupils equal round and reactive to light [Normal Outer Ear/Nose] : the outer ears and nose were normal in appearance [Normal Oropharynx] : the oropharynx was normal [Normal TMs] : both tympanic membranes were normal [No Lymphadenopathy] : no lymphadenopathy [Supple] : supple [No Respiratory Distress] : no respiratory distress  [No Accessory Muscle Use] : no accessory muscle use [Clear to Auscultation] : lungs were clear to auscultation bilaterally [Normal Rate] : normal rate  [Regular Rhythm] : with a regular rhythm [Normal S1, S2] : normal S1 and S2 [No Carotid Bruits] : no carotid bruits [Pedal Pulses Present] : the pedal pulses are present [No Edema] : there was no peripheral edema [Soft] : abdomen soft [Non Tender] : non-tender [Non-distended] : non-distended [Normal Bowel Sounds] : normal bowel sounds [Normal Supraclavicular Nodes] : no supraclavicular lymphadenopathy [Normal Axillary Nodes] : no axillary lymphadenopathy [Normal Posterior Cervical Nodes] : no posterior cervical lymphadenopathy [Normal Anterior Cervical Nodes] : no anterior cervical lymphadenopathy [Normal Inguinal Nodes] : no inguinal lymphadenopathy [Grossly Normal Strength/Tone] : grossly normal strength/tone [No Focal Deficits] : no focal deficits [Normal Gait] : normal gait [Normal Affect] : the affect was normal [Normal Insight/Judgement] : insight and judgment were intact [Thyroid Normal, No Nodules] : the thyroid was normal and there were no nodules present [Normal Appearance] : normal in appearance [No Masses] : no palpable masses [No Axillary Lymphadenopathy] : no axillary lymphadenopathy [de-identified] : murmur

## 2023-07-25 NOTE — PLAN
[FreeTextEntry1] : due to cost issues pt wants to ck w her insurance or get the vac at the pharmacy- rec shingrix, prevnar 20, tdap \par lipid- rec starting statin \par DM- restart mealtime insulin .  start jardiance- SE discussed\par fasting labs- Blood was collected in the office.\par

## 2023-07-25 NOTE — HEALTH RISK ASSESSMENT
[Good] : ~his/her~  mood as  good [Never (0 pts)] : Never (0 points) [No] : In the past 12 months have you used drugs other than those required for medical reasons? No [No falls in past year] : Patient reported no falls in the past year [0] : 2) Feeling down, depressed, or hopeless: Not at all (0) [PHQ-2 Negative - No further assessment needed] : PHQ-2 Negative - No further assessment needed [Patient reported mammogram was normal] : Patient reported mammogram was normal [Patient reported PAP Smear was normal] : Patient reported PAP Smear was normal [Patient reported colonoscopy was abnormal] : Patient reported colonoscopy was abnormal [HIV test declined] : HIV test declined [Hepatitis C test declined] : Hepatitis C test declined [None] : None [With Family] : lives with family [Retired] : retired [High School] : high school [# Of Children ___] : has [unfilled] children [Feels Safe at Home] : Feels safe at home [Fully functional (bathing, dressing, toileting, transferring, walking, feeding)] : Fully functional (bathing, dressing, toileting, transferring, walking, feeding) [Fully functional (using the telephone, shopping, preparing meals, housekeeping, doing laundry, using] : Fully functional and needs no help or supervision to perform IADLs (using the telephone, shopping, preparing meals, housekeeping, doing laundry, using transportation, managing medications and managing finances) [Smoke Detector] : smoke detector [Carbon Monoxide Detector] : carbon monoxide detector [Seat Belt] :  uses seat belt [Sunscreen] : uses sunscreen [With Patient/Caregiver] : , with patient/caregiver [Designated Healthcare Proxy] : Designated healthcare proxy [Name: ___] : Health Care Proxy's Name: [unfilled]  [Relationship: ___] : Relationship: [unfilled] [Audit-CScore] : 0 [[id number]] : 0 [Change in mental status noted] : No change in mental status noted [High Risk Behavior] : no high risk behavior [Reports changes in hearing] : Reports no changes in hearing [Reports changes in vision] : Reports no changes in vision [Guns at Home] : no guns at home [Travel to Developing Areas] : does not  travel to developing areas [MammogramDate] : 01/19 [MammogramComments] : as per pt [PapSmearDate] : 10/21 [PapSmearComments] : as per pt. seen GYN 10/2022- states she sees GYN yearly [ColonoscopyDate] : 02/23 [ColonoscopyComments] : diverticuli [de-identified] : delayed short term memory- names of occasional acquaintances. able to do finance [de-identified] : daughter [FreeTextEntry3] : separate [de-identified] : seen ophth yearly [de-identified] : pt has dentures [AdvancecareDate] : 07/23

## 2023-07-26 LAB
ALBUMIN SERPL ELPH-MCNC: 4.3 G/DL
ALP BLD-CCNC: 92 U/L
ALT SERPL-CCNC: 17 U/L
ANION GAP SERPL CALC-SCNC: 12 MMOL/L
AST SERPL-CCNC: 21 U/L
BILIRUB SERPL-MCNC: 0.3 MG/DL
BUN SERPL-MCNC: 17 MG/DL
CALCIUM SERPL-MCNC: 10.7 MG/DL
CHLORIDE SERPL-SCNC: 98 MMOL/L
CHOLEST SERPL-MCNC: 277 MG/DL
CO2 SERPL-SCNC: 26 MMOL/L
CREAT SERPL-MCNC: 1 MG/DL
CREAT SPEC-SCNC: 216 MG/DL
EGFR: 57 ML/MIN/1.73M2
ESTIMATED AVERAGE GLUCOSE: 249 MG/DL
GLUCOSE SERPL-MCNC: 167 MG/DL
HBA1C MFR BLD HPLC: 10.3 %
HDLC SERPL-MCNC: 55 MG/DL
LDLC SERPL CALC-MCNC: 194 MG/DL
LDLC SERPL DIRECT ASSAY-MCNC: 210 MG/DL
MICROALBUMIN 24H UR DL<=1MG/L-MCNC: 4.4 MG/DL
MICROALBUMIN/CREAT 24H UR-RTO: 20 MG/G
NONHDLC SERPL-MCNC: 223 MG/DL
POTASSIUM SERPL-SCNC: 4.8 MMOL/L
PROT SERPL-MCNC: 8 G/DL
SODIUM SERPL-SCNC: 137 MMOL/L
T4 FREE SERPL-MCNC: 1.2 NG/DL
TRIGL SERPL-MCNC: 156 MG/DL
TSH SERPL-ACNC: 1.4 UIU/ML

## 2023-07-28 RX ORDER — INSULIN LISPRO 100 [IU]/ML
100 INJECTION, SOLUTION INTRAVENOUS; SUBCUTANEOUS
Qty: 1 | Refills: 3 | Status: DISCONTINUED | COMMUNITY
Start: 2022-09-22 | End: 2023-07-28

## 2023-08-10 ENCOUNTER — APPOINTMENT (OUTPATIENT)
Dept: MAMMOGRAPHY | Facility: IMAGING CENTER | Age: 83
End: 2023-08-10
Payer: MEDICARE

## 2023-08-10 ENCOUNTER — RESULT REVIEW (OUTPATIENT)
Age: 83
End: 2023-08-10

## 2023-08-10 ENCOUNTER — OUTPATIENT (OUTPATIENT)
Dept: OUTPATIENT SERVICES | Facility: HOSPITAL | Age: 83
LOS: 1 days | End: 2023-08-10
Payer: COMMERCIAL

## 2023-08-10 DIAGNOSIS — Z12.31 ENCOUNTER FOR SCREENING MAMMOGRAM FOR MALIGNANT NEOPLASM OF BREAST: ICD-10-CM

## 2023-08-10 PROCEDURE — 77067 SCR MAMMO BI INCL CAD: CPT | Mod: 26

## 2023-08-10 PROCEDURE — 77063 BREAST TOMOSYNTHESIS BI: CPT

## 2023-08-10 PROCEDURE — 77063 BREAST TOMOSYNTHESIS BI: CPT | Mod: 26

## 2023-08-10 PROCEDURE — 77067 SCR MAMMO BI INCL CAD: CPT

## 2023-08-11 ENCOUNTER — APPOINTMENT (OUTPATIENT)
Dept: INTERNAL MEDICINE | Facility: CLINIC | Age: 83
End: 2023-08-11

## 2023-08-18 ENCOUNTER — APPOINTMENT (OUTPATIENT)
Dept: MAMMOGRAPHY | Facility: IMAGING CENTER | Age: 83
End: 2023-08-18
Payer: MEDICARE

## 2023-08-18 ENCOUNTER — OUTPATIENT (OUTPATIENT)
Dept: OUTPATIENT SERVICES | Facility: HOSPITAL | Age: 83
LOS: 1 days | End: 2023-08-18
Payer: COMMERCIAL

## 2023-08-18 ENCOUNTER — RESULT REVIEW (OUTPATIENT)
Age: 83
End: 2023-08-18

## 2023-08-18 DIAGNOSIS — Z00.8 ENCOUNTER FOR OTHER GENERAL EXAMINATION: ICD-10-CM

## 2023-08-18 PROCEDURE — 77065 DX MAMMO INCL CAD UNI: CPT | Mod: 26,LT

## 2023-08-18 PROCEDURE — G0279: CPT

## 2023-08-18 PROCEDURE — G0279: CPT | Mod: 26

## 2023-08-18 PROCEDURE — 77065 DX MAMMO INCL CAD UNI: CPT

## 2023-08-30 ENCOUNTER — APPOINTMENT (OUTPATIENT)
Dept: INTERNAL MEDICINE | Facility: CLINIC | Age: 83
End: 2023-08-30
Payer: MEDICARE

## 2023-08-30 VITALS
RESPIRATION RATE: 18 BRPM | BODY MASS INDEX: 25.39 KG/M2 | WEIGHT: 158 LBS | HEART RATE: 74 BPM | SYSTOLIC BLOOD PRESSURE: 133 MMHG | OXYGEN SATURATION: 96 % | HEIGHT: 66 IN | DIASTOLIC BLOOD PRESSURE: 89 MMHG

## 2023-08-30 DIAGNOSIS — K59.09 OTHER CONSTIPATION: ICD-10-CM

## 2023-08-30 DIAGNOSIS — K92.2 GASTROINTESTINAL HEMORRHAGE, UNSPECIFIED: ICD-10-CM

## 2023-08-30 PROCEDURE — 99214 OFFICE O/P EST MOD 30 MIN: CPT

## 2023-08-30 NOTE — HISTORY OF PRESENT ILLNESS
[FreeTextEntry1] : Patient was hospitalized at [facility] 2/3/23 - 2/7/23 with LGIB:  Hospital Course: 82-year-old female with PMHx of HTH, HLD, T2DM, prolapsed uterus s/p elective hystrectomy (09/12/2022, [facility]), L carotid bruit, thyroid nodule, gestational internal hemorrhoids, chronic constipation and prior cardiac arrhythmia (irregular) not on AC, presented with BRBPR and reported vasovagal syncopal event. AGGIE on admission with maroon colored blood. For BRBPR, GI consulted. Patient started on protonix 40mg IV BID, plan for colonoscopy/endoscopy on 2/6. Patient is [facility] and doesnt not accept blood transfusions at this time. Hospital course c/b HTN. Initially patients home antihypertensives held given GIB. Resumed home lisinopril 20mg and toprol 50mg however patient remained hypertensive. Home lisinopril was increased to 40mg qd and she was started on amlodipine 5mg which was previously a home medication, however patient self-discontinued. Pt reports no new BMs on 2/5, Hgb at baseline 10.4, stepped down to RMF. Patient underwent colonoscopy on 2/6 showing multiple non-bleeding diverticula in the whole colon and no stigmata of active or recent bleeding throughout colon. AM Hb remained stable; patient is stable for discharge with close PCP f/u for hypertension, diabetes control, and GI.  Hb was 11.8 on admission, and 10.6 at discharge. Hb was 12.5 at CPE last month (7/26/23)  Patient had a ABEL one year ago; has been constipated since; takes "Colon Cleanser" - which works No abd pain No weight loss Good appetite

## 2023-08-30 NOTE — ASSESSMENT
[FreeTextEntry1] : 82-year-old woman with hypertension, hypercholesterolemia, diabetes, CAD Status post lower GI bleed 6 months ago at [facility] (presented with syncope as well)  Colonoscopy and CT abdomen pelvis at the time were unrevealing for cause of bleeding She has had no recurrence of bleeding and her hemoglobin is now back up to baseline  The etiology of her bleed may have been from diverticular disease,?  Ischemic colitis At this point, she is stable and feeling well and has no alarm signs or symptoms If recurrent bleeding presents, will reconsult  Regarding the constipation, the patient takes a stool softener/laxative which is effective Dietary modification with adequate hydration and increased fiber was discussed

## 2023-10-31 ENCOUNTER — NON-APPOINTMENT (OUTPATIENT)
Age: 83
End: 2023-10-31

## 2023-10-31 ENCOUNTER — APPOINTMENT (OUTPATIENT)
Dept: CARDIOLOGY | Facility: CLINIC | Age: 83
End: 2023-10-31
Payer: MEDICARE

## 2023-10-31 VITALS
SYSTOLIC BLOOD PRESSURE: 132 MMHG | WEIGHT: 160 LBS | OXYGEN SATURATION: 97 % | HEIGHT: 66 IN | DIASTOLIC BLOOD PRESSURE: 72 MMHG | HEART RATE: 73 BPM | BODY MASS INDEX: 25.71 KG/M2

## 2023-10-31 PROCEDURE — 93000 ELECTROCARDIOGRAM COMPLETE: CPT

## 2023-10-31 PROCEDURE — 99214 OFFICE O/P EST MOD 30 MIN: CPT | Mod: 25

## 2023-11-02 ENCOUNTER — APPOINTMENT (OUTPATIENT)
Dept: INTERNAL MEDICINE | Facility: CLINIC | Age: 83
End: 2023-11-02
Payer: MEDICARE

## 2023-11-02 VITALS
HEIGHT: 66 IN | DIASTOLIC BLOOD PRESSURE: 70 MMHG | WEIGHT: 155 LBS | BODY MASS INDEX: 24.91 KG/M2 | SYSTOLIC BLOOD PRESSURE: 130 MMHG | OXYGEN SATURATION: 98 % | TEMPERATURE: 96 F | HEART RATE: 79 BPM

## 2023-11-02 VITALS — SYSTOLIC BLOOD PRESSURE: 120 MMHG | DIASTOLIC BLOOD PRESSURE: 70 MMHG

## 2023-11-02 DIAGNOSIS — R26.9 UNSPECIFIED ABNORMALITIES OF GAIT AND MOBILITY: ICD-10-CM

## 2023-11-02 DIAGNOSIS — N39.41 URGE INCONTINENCE: ICD-10-CM

## 2023-11-02 PROCEDURE — 99214 OFFICE O/P EST MOD 30 MIN: CPT | Mod: 25

## 2023-11-02 RX ORDER — EMPAGLIFLOZIN 25 MG/1
25 TABLET, FILM COATED ORAL
Qty: 90 | Refills: 0 | Status: DISCONTINUED | COMMUNITY
Start: 2023-07-25 | End: 2023-11-02

## 2023-11-02 RX ORDER — ROSUVASTATIN CALCIUM 20 MG/1
20 TABLET, FILM COATED ORAL
Qty: 90 | Refills: 1 | Status: DISCONTINUED | COMMUNITY
Start: 2022-02-03 | End: 2023-11-02

## 2023-11-03 LAB
ALBUMIN SERPL ELPH-MCNC: 4.1 G/DL
ALP BLD-CCNC: 82 U/L
ALT SERPL-CCNC: 17 U/L
ANION GAP SERPL CALC-SCNC: 15 MMOL/L
AST SERPL-CCNC: 19 U/L
BILIRUB SERPL-MCNC: 0.2 MG/DL
BUN SERPL-MCNC: 14 MG/DL
CALCIUM SERPL-MCNC: 10.1 MG/DL
CHLORIDE SERPL-SCNC: 101 MMOL/L
CO2 SERPL-SCNC: 24 MMOL/L
CREAT SERPL-MCNC: 1.07 MG/DL
EGFR: 53 ML/MIN/1.73M2
ESTIMATED AVERAGE GLUCOSE: 252 MG/DL
GLUCOSE SERPL-MCNC: 276 MG/DL
HBA1C MFR BLD HPLC: 10.4 %
POTASSIUM SERPL-SCNC: 4.8 MMOL/L
PROT SERPL-MCNC: 7.8 G/DL
SODIUM SERPL-SCNC: 139 MMOL/L

## 2023-12-31 PROBLEM — Z23 NEED FOR VACCINATION WITH 13-POLYVALENT PNEUMOCOCCAL CONJUGATE VACCINE: Status: ACTIVE | Noted: 2021-11-24

## 2024-01-31 ENCOUNTER — APPOINTMENT (OUTPATIENT)
Dept: ENDOCRINOLOGY | Facility: CLINIC | Age: 84
End: 2024-01-31
Payer: MEDICARE

## 2024-01-31 VITALS
WEIGHT: 157 LBS | SYSTOLIC BLOOD PRESSURE: 130 MMHG | OXYGEN SATURATION: 95 % | DIASTOLIC BLOOD PRESSURE: 70 MMHG | BODY MASS INDEX: 25.23 KG/M2 | HEIGHT: 66 IN | HEART RATE: 77 BPM | TEMPERATURE: 97.3 F

## 2024-01-31 LAB — HBA1C MFR BLD HPLC: 11.2

## 2024-01-31 PROCEDURE — 83036 HEMOGLOBIN GLYCOSYLATED A1C: CPT | Mod: QW

## 2024-01-31 PROCEDURE — 99205 OFFICE O/P NEW HI 60 MIN: CPT | Mod: 25

## 2024-01-31 PROCEDURE — G2211 COMPLEX E/M VISIT ADD ON: CPT

## 2024-01-31 RX ORDER — INSULIN ASPART 100 [IU]/ML
100 INJECTION, SOLUTION INTRAVENOUS; SUBCUTANEOUS
Qty: 2 | Refills: 6 | Status: ACTIVE | COMMUNITY
Start: 2023-07-28 | End: 1900-01-01

## 2024-01-31 RX ORDER — INSULIN GLARGINE 100 [IU]/ML
100 INJECTION, SOLUTION SUBCUTANEOUS
Qty: 3 | Refills: 3 | Status: ACTIVE | COMMUNITY
Start: 1900-01-01 | End: 1900-01-01

## 2024-01-31 NOTE — HISTORY OF PRESENT ILLNESS
[FreeTextEntry1] : #Diabetes Mellitus Type 2     Diagnosis- age 72 Current regimen: Basaglar 36 units in morning (skips once a week) + MFM 500mg daily (skips frequently) occasionally takes novolog   Other diabetic medications discontinued in the past: 1.  Januvia                           Reason for discontinuation: Felt funny on it   PCP/prior endocrinologist: Dr. Guzman  Signs/symptoms: +polyuria  Last HgbA1c: 11.2%  Home blood sugars: once a day in morning- 150-160 Hypoglycemia: Denies  History of gestational diabetes: Denies   History of CAD: Denies History of CVA: Denies  FH of diabetes: Father, sister, two children with t2DM   Diet:  Breakfast: oatmeal and fish  Lunch: sandwich  Dinner: vegetables  Snacks: denies  Drinks: drinks juice, coconut water Met with diabetes educator?   Exercise:  walks everyday   eGFR: 53    Alb/creat:  Follow with nephrology?   AST: ALT:  Last eye exam: UTD Evidence of retinopathy? had laser   Last foot exam: UTD  Any issues? 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

## 2024-01-31 NOTE — ASSESSMENT
[FreeTextEntry1] : #Type 2 DM - long standing history of type 2 DM severely uncontrolled - This is likely patient being non compliant with her medications - Was prescribed basaglar 34 units daily + Novolog 6 units TID + MFM 750mg daily however she admits that she skips her MFM and Novolog on most days and occasionally takes basaglar  - only checking sugars occasionally in the morning  Plan: - Counseled patient extensively the importance of adherence to medications  - Take basaglar 34 units consistently - Start taking novolog 6 units TID with meals - Increase MFM to 750mg BID  - start checking sugars multiple times a day - we discussed blood sugar goals   Patient counseled extensively about the complications of diabetes including but not limited to nephropathy, neuropathy, and retinopathy. We discussed the importance of annual foot and optho exams. Explained that ideally blood sugars in the morning prior to breakfast should be between 80 and 130. Blood sugars should be checked 2 hours after eating and should be <180. If blood sugar is <70, patient should treat the blood sugar FIRST and then contact provider. Advised patient to let us know if BG persistently <70 or >200  #HLD - not on statin - will check lipid panel  #HTN - c/w current regimen - will get alb/creat ratio

## 2024-02-09 ENCOUNTER — APPOINTMENT (OUTPATIENT)
Dept: INTERNAL MEDICINE | Facility: CLINIC | Age: 84
End: 2024-02-09
Payer: MEDICARE

## 2024-02-09 VITALS
SYSTOLIC BLOOD PRESSURE: 152 MMHG | OXYGEN SATURATION: 98 % | BODY MASS INDEX: 24.59 KG/M2 | DIASTOLIC BLOOD PRESSURE: 67 MMHG | WEIGHT: 153 LBS | HEART RATE: 66 BPM | HEIGHT: 66 IN | TEMPERATURE: 98 F

## 2024-02-09 VITALS — DIASTOLIC BLOOD PRESSURE: 60 MMHG | SYSTOLIC BLOOD PRESSURE: 110 MMHG

## 2024-02-09 DIAGNOSIS — Z86.39 PERSONAL HISTORY OF OTHER ENDOCRINE, NUTRITIONAL AND METABOLIC DISEASE: ICD-10-CM

## 2024-02-09 PROCEDURE — G2211 COMPLEX E/M VISIT ADD ON: CPT

## 2024-02-09 PROCEDURE — 99214 OFFICE O/P EST MOD 30 MIN: CPT

## 2024-02-09 RX ORDER — TERBINAFINE HCL 1 %
1 CREAM (GRAM) TOPICAL 3 TIMES DAILY
Qty: 1 | Refills: 3 | Status: ACTIVE | COMMUNITY
Start: 2022-03-22 | End: 1900-01-01

## 2024-02-09 NOTE — PHYSICAL EXAM
[No Carotid Bruits] : no carotid bruits [None] : no ulcers in either foot were found [] : normal [de-identified] : slow gait [de-identified] : scaly rash [TextEntry] : Constitutional: no acute distress, well nourished, well developed and well-appearing. Pulmonary: no respiratory distress, lungs were clear to auscultation bilaterally, no accessory muscle use. Cardiac: normal rate, with a regular rhythm, normal S1 and S2 and no murmur heard.  Vascular: there was no peripheral edema. Abdomen: abdomen soft, non-tender, non-distended, no abdominal mass palpated and normal bowel sounds. Psychiatric: the affect was normal and insight and judgement were intact

## 2024-02-09 NOTE — PLAN
[FreeTextEntry1] : DM- discussed dietary, medication compliance, w pt chol- pt given copy of her med list- pt will bring in her meds at next visit- compliance w zetia discussed

## 2024-02-09 NOTE — HISTORY OF PRESENT ILLNESS
[FreeTextEntry1] : DM [de-identified] : exercise- walking 30 min/ d reviewed 1/31/24 labs- , A1c 11.2.  reivewed 1/3/124 Endo notes- incr Basalar 34 u, novolog 6 TID, incr metformin 750 BID.   but pt is taking Basaglar 32 U, metformin once daily pt requesting a walker.  states cane feels unstable DM-taking 36 U basaglar. but never started humalog 6 Units. She is  pt is  non compliant w  meds- not taking januvia- states she is not taking it b/c she feels weak w it.  non compliance w diet-  eating ice cream, candy- once wk- rec to Endo.  . stopped eating ice cream pt states she was seen by Nutritionist fasting glucose - 140's bedtime gluc 170's ophth-01/22- will make f/u appt HTN-  norvasc - rpt ECHO rec and f/u in 6 mo.  home BP  Lipid--  crestor- stopped due to myalgia-  reviewed 10/31/23 Cardio- started zetia- pt doesn't think she is taking Zetia reviewed 8/18/23 Mammo- rec 6 mo f/u abn EKG, murmur- rec ECHO.  has appt w Cardio.  denies any CP thyroid nodule- rec US  states has urge incontinence- states has been chronic for many yrs. - using adult diapers.  hx of GI bleed- had colonoscopy.  no further GI bleeding. anemia resolved. reviewed 8/30/23 GI notes.  states she had partial hysterectomy 09/2022 for prolapse- also seen GYN 10/2022- as per pt, he did a breast exam- states she sees GYN yearly

## 2024-02-15 ENCOUNTER — APPOINTMENT (OUTPATIENT)
Dept: INTERNAL MEDICINE | Facility: CLINIC | Age: 84
End: 2024-02-15
Payer: MEDICARE

## 2024-02-15 DIAGNOSIS — U07.1 COVID-19: ICD-10-CM

## 2024-02-15 LAB
ALBUMIN SERPL ELPH-MCNC: 4.2 G/DL
ALP BLD-CCNC: 79 U/L
ALT SERPL-CCNC: 17 U/L
ANION GAP SERPL CALC-SCNC: 13 MMOL/L
AST SERPL-CCNC: 18 U/L
BILIRUB SERPL-MCNC: 0.3 MG/DL
BUN SERPL-MCNC: 19 MG/DL
CALCIUM SERPL-MCNC: 9.8 MG/DL
CHLORIDE SERPL-SCNC: 98 MMOL/L
CHOLEST SERPL-MCNC: 252 MG/DL
CO2 SERPL-SCNC: 25 MMOL/L
CREAT SERPL-MCNC: 0.95 MG/DL
EGFR: 60 ML/MIN/1.73M2
GLUCOSE SERPL-MCNC: 257 MG/DL
HDLC SERPL-MCNC: 45 MG/DL
LDLC SERPL CALC-MCNC: 168 MG/DL
NONHDLC SERPL-MCNC: 208 MG/DL
POTASSIUM SERPL-SCNC: 4.6 MMOL/L
PROT SERPL-MCNC: 7.8 G/DL
SODIUM SERPL-SCNC: 136 MMOL/L
TRIGL SERPL-MCNC: 215 MG/DL
TSH SERPL-ACNC: 1.3 UIU/ML

## 2024-02-15 PROCEDURE — 99441: CPT | Mod: 93

## 2024-02-15 NOTE — PLAN
[FreeTextEntry1] : COVID-start paxlovid - SE discussed and rebound COVID discussed. stop rosuvastatin for 8 days.  decr amlodipine 1/2 tab and monitor BP, temp daily- if bp is going up - can take 1 tab of amlodipine.  hydrate  10  minutes was spent during this encounter preparing for this encounter, evaluating the patient and documenting the above EM service.  This includes counseling, and educating the patient on the disease course and it's treatment/ management.

## 2024-02-15 NOTE — HISTORY OF PRESENT ILLNESS
[Home] : at home, [unfilled] , at the time of the visit. [Medical Office: ([facility])___] : at the medical office located in  [Verbal consent obtained from patient] : the patient, [unfilled] [FreeTextEntry8] : CC: COVID Sun- pt went to a Convention Tues- chill, sore throat, myalgia, felt feverish. not ck temp Wed- dev cough- took home COVID test+  today, Thurs, -  no dyspnea  COVID-19 Education:  The patient is suspected of having COVID-19.  Signs and symptoms were discussed.  Patient educated to self-isolate in a room in the home away from others.  Mask if available.  Patient advised not to leave the home except for testing.     Self-treatment discussed including Tylenol for fever, pain and myalgia; cough and cold medications for symptoms.  Patient to check temperature daily.  Monitor for symptoms of respiratory distress.  To notify our office with important status updates.  Nature of disease to cause severe respiratory distress day 8 or 9 discussed.  If needs emergent care to notify EMS or ED or our office that he may have COVID to allow for proper PPE and isolation.    Phone Time Documentation: Spent ___   minutes with patient on phone and >50% of the time spent in the encounter involved counseling and coordination of care for any or all of the diagnosis submitted

## 2024-03-04 NOTE — ED PROVIDER NOTE - NEURO NEGATIVE STATEMENT, MLM
Hydroxychloroquine Pregnancy And Lactation Text: This medication has been shown to cause fetal harm but it isn't assigned a Pregnancy Risk Category. There are small amounts excreted in breast milk. no loss of consciousness, no gait abnormality, no headache, no sensory deficits, and no weakness.

## 2024-05-02 ENCOUNTER — APPOINTMENT (OUTPATIENT)
Dept: CARDIOLOGY | Facility: CLINIC | Age: 84
End: 2024-05-02

## 2024-05-06 DIAGNOSIS — I51.89 OTHER ILL-DEFINED HEART DISEASES: ICD-10-CM

## 2024-05-06 DIAGNOSIS — I70.0 ATHEROSCLEROSIS OF AORTA: ICD-10-CM

## 2024-05-07 ENCOUNTER — APPOINTMENT (OUTPATIENT)
Dept: ENDOCRINOLOGY | Facility: CLINIC | Age: 84
End: 2024-05-07

## 2024-05-10 ENCOUNTER — APPOINTMENT (OUTPATIENT)
Dept: INTERNAL MEDICINE | Facility: CLINIC | Age: 84
End: 2024-05-10

## 2024-05-14 RX ORDER — ROSUVASTATIN CALCIUM 5 MG/1
5 TABLET, FILM COATED ORAL
Qty: 30 | Refills: 4 | Status: ACTIVE | COMMUNITY
Start: 2024-02-15 | End: 1900-01-01

## 2024-05-23 ENCOUNTER — APPOINTMENT (OUTPATIENT)
Dept: INTERNAL MEDICINE | Facility: CLINIC | Age: 84
End: 2024-05-23
Payer: MEDICARE

## 2024-05-23 VITALS
TEMPERATURE: 97.9 F | WEIGHT: 151 LBS | SYSTOLIC BLOOD PRESSURE: 133 MMHG | OXYGEN SATURATION: 99 % | BODY MASS INDEX: 24.27 KG/M2 | HEART RATE: 64 BPM | HEIGHT: 66 IN | DIASTOLIC BLOOD PRESSURE: 75 MMHG

## 2024-05-23 DIAGNOSIS — R92.1 MAMMOGRAPHIC CALCIFICATION FOUND ON DIAGNOSTIC IMAGING OF BREAST: ICD-10-CM

## 2024-05-23 DIAGNOSIS — R92.8 OTHER ABNORMAL AND INCONCLUSIVE FINDINGS ON DIAGNOSTIC IMAGING OF BREAST: ICD-10-CM

## 2024-05-23 DIAGNOSIS — Z12.31 ENCOUNTER FOR SCREENING MAMMOGRAM FOR MALIGNANT NEOPLASM OF BREAST: ICD-10-CM

## 2024-05-23 DIAGNOSIS — R94.31 ABNORMAL ELECTROCARDIOGRAM [ECG] [EKG]: ICD-10-CM

## 2024-05-23 DIAGNOSIS — E78.5 HYPERLIPIDEMIA, UNSPECIFIED: ICD-10-CM

## 2024-05-23 DIAGNOSIS — B35.3 TINEA PEDIS: ICD-10-CM

## 2024-05-23 DIAGNOSIS — I10 ESSENTIAL (PRIMARY) HYPERTENSION: ICD-10-CM

## 2024-05-23 DIAGNOSIS — E11.9 TYPE 2 DIABETES MELLITUS W/OUT COMPLICATIONS: ICD-10-CM

## 2024-05-23 PROCEDURE — 99214 OFFICE O/P EST MOD 30 MIN: CPT | Mod: 25

## 2024-05-23 PROCEDURE — G2211 COMPLEX E/M VISIT ADD ON: CPT | Mod: NC,1L

## 2024-05-23 RX ORDER — AMLODIPINE BESYLATE 10 MG/1
10 TABLET ORAL
Qty: 1 | Refills: 1 | Status: ACTIVE | COMMUNITY
Start: 2022-12-09 | End: 1900-01-01

## 2024-05-23 RX ORDER — TALC/STARCH
POWDER (GRAM) TOPICAL TWICE DAILY
Qty: 1 | Refills: 2 | Status: ACTIVE | COMMUNITY
Start: 2024-05-23 | End: 1900-01-01

## 2024-05-23 RX ORDER — TERBINAFINE HCL 1 %
1 CREAM (GRAM) TOPICAL 3 TIMES DAILY
Qty: 1 | Refills: 3 | Status: ACTIVE | COMMUNITY
Start: 2024-05-23 | End: 1900-01-01

## 2024-05-23 RX ORDER — METFORMIN ER 750 MG 750 MG/1
750 TABLET ORAL
Qty: 60 | Refills: 2 | Status: DISCONTINUED | COMMUNITY
Start: 2023-11-02 | End: 2024-05-23

## 2024-05-23 RX ORDER — NIRMATRELVIR AND RITONAVIR 300-100 MG
20 X 150 MG & KIT ORAL
Qty: 1 | Refills: 0 | Status: DISCONTINUED | COMMUNITY
Start: 2024-02-15 | End: 2024-05-23

## 2024-05-23 RX ORDER — EZETIMIBE 10 MG/1
10 TABLET ORAL
Qty: 90 | Refills: 3 | Status: ACTIVE | COMMUNITY
Start: 2023-10-31 | End: 1900-01-01

## 2024-05-23 RX ORDER — LISINOPRIL 40 MG/1
40 TABLET ORAL
Qty: 1 | Refills: 1 | Status: ACTIVE | COMMUNITY
Start: 1900-01-01 | End: 1900-01-01

## 2024-05-23 NOTE — HISTORY OF PRESENT ILLNESS
[FreeTextEntry1] : DM [de-identified] : exercise- walking 30 min/ d reviewed 1/31/24 labs- , A1c 11.2.  reviewed 1/3/124 Endo notes- incr Basalar 34 u, novolog 6 TID, incr metformin 750 BID.   but pt is taking Basaglar 32 U, metformin once daily.  2/15/24 Endo rec adding crestor every other day- pt has not been taking crestor pt requesting a walker.  states cane feels unstable- misplace prescription DM-taking 33 U basaglar. but never started novolog 8 Units. She is  ds- not taking  metformin- get stomach upset- states she is not taking it b/c she feels weak w it.   better compliance w diet-  stopped eating ice cream, candy-  pt states she was seen by Nutritionist fasting glucose - 120-140's bedtime gluc 160's ophth-01/22- will make f/u appt HTN-  norvasc, lisinopril, metoprolol - rpt ECHO rec and f/u in 6 mo.  home BP .  ran out of lisinopril Lipid--  crestor- stopped due to myalgia-  reviewed 10/31/23 Cardio- started zetia- pt doesn't think she is taking Zetia reviewed 8/18/23 Mammo- rec 6 mo f/u abn EKG, murmur- rec ECHO.  has appt w Cardio.  denies any CP thyroid nodule- rec US  states she had partial hysterectomy 09/2022 for prolapse- also seen GYN 10/2022- as per pt, he did a breast exam- states she sees GYN yearly

## 2024-05-23 NOTE — PLAN
[FreeTextEntry1] : lipid- rec pt try zetia, trial of rosuvastatin every other day non fasting- Blood was collected in the office. later do fasting lipid

## 2024-05-23 NOTE — PHYSICAL EXAM
[None] : no ulcers in either foot were found [] : normal [de-identified] : slow gait [de-identified] : scaly rash [TextEntry] : Constitutional: no acute distress, well nourished, well developed and well-appearing. Pulmonary: no respiratory distress, lungs were clear to auscultation bilaterally, no accessory muscle use. Cardiac: normal rate, with a regular rhythm, normal S1 and S2 and no murmur heard.  Vascular: there was no peripheral edema. Abdomen: abdomen soft, non-tender, non-distended, no abdominal mass palpated and normal bowel sounds. Psychiatric: the affect was normal and insight and judgement were intact

## 2024-05-28 ENCOUNTER — NON-APPOINTMENT (OUTPATIENT)
Age: 84
End: 2024-05-28

## 2024-05-28 LAB
ALBUMIN SERPL ELPH-MCNC: 4 G/DL
ALP BLD-CCNC: 73 U/L
ALT SERPL-CCNC: 13 U/L
ANION GAP SERPL CALC-SCNC: 11 MMOL/L
AST SERPL-CCNC: 20 U/L
BILIRUB SERPL-MCNC: 0.3 MG/DL
BUN SERPL-MCNC: 15 MG/DL
CALCIUM SERPL-MCNC: 9.6 MG/DL
CHLORIDE SERPL-SCNC: 103 MMOL/L
CO2 SERPL-SCNC: 26 MMOL/L
CREAT SERPL-MCNC: 0.91 MG/DL
EGFR: 63 ML/MIN/1.73M2
ESTIMATED AVERAGE GLUCOSE: 223 MG/DL
GLUCOSE SERPL-MCNC: 96 MG/DL
HBA1C MFR BLD HPLC: 9.4 %
POTASSIUM SERPL-SCNC: 4.2 MMOL/L
PROT SERPL-MCNC: 7.3 G/DL
SODIUM SERPL-SCNC: 139 MMOL/L
T4 FREE SERPL-MCNC: 1.1 NG/DL
TSH SERPL-ACNC: 0.89 UIU/ML

## 2024-06-25 RX ORDER — METOPROLOL SUCCINATE 50 MG/1
50 TABLET, EXTENDED RELEASE ORAL DAILY
Qty: 1 | Refills: 3 | Status: ACTIVE | COMMUNITY
Start: 2022-02-03 | End: 1900-01-01

## 2024-06-28 ENCOUNTER — APPOINTMENT (OUTPATIENT)
Dept: INTERNAL MEDICINE | Facility: CLINIC | Age: 84
End: 2024-06-28

## 2024-07-12 ENCOUNTER — APPOINTMENT (OUTPATIENT)
Dept: MAMMOGRAPHY | Facility: CLINIC | Age: 84
End: 2024-07-12

## 2024-07-12 ENCOUNTER — APPOINTMENT (OUTPATIENT)
Dept: ULTRASOUND IMAGING | Facility: CLINIC | Age: 84
End: 2024-07-12

## 2024-07-12 ENCOUNTER — NON-APPOINTMENT (OUTPATIENT)
Age: 84
End: 2024-07-12

## 2024-07-12 ENCOUNTER — RESULT REVIEW (OUTPATIENT)
Age: 84
End: 2024-07-12

## 2024-07-12 PROCEDURE — 76536 US EXAM OF HEAD AND NECK: CPT

## 2024-07-12 PROCEDURE — 77066 DX MAMMO INCL CAD BI: CPT

## 2024-07-12 PROCEDURE — G0279: CPT

## 2024-07-14 PROBLEM — E04.1 THYROID NODULE: Status: ACTIVE | Noted: 2021-11-24

## 2024-08-26 ENCOUNTER — RX RENEWAL (OUTPATIENT)
Age: 84
End: 2024-08-26

## 2024-09-03 ENCOUNTER — RESULT REVIEW (OUTPATIENT)
Age: 84
End: 2024-09-03

## 2024-09-03 ENCOUNTER — APPOINTMENT (OUTPATIENT)
Dept: MAMMOGRAPHY | Facility: CLINIC | Age: 84
End: 2024-09-03
Payer: MEDICARE

## 2024-09-03 PROCEDURE — 19081 BX BREAST 1ST LESION STRTCTC: CPT | Mod: LT

## 2024-09-03 PROCEDURE — 77065 DX MAMMO INCL CAD UNI: CPT | Mod: LT

## 2024-09-03 PROCEDURE — A4648: CPT

## 2024-09-09 ENCOUNTER — NON-APPOINTMENT (OUTPATIENT)
Age: 84
End: 2024-09-09

## 2024-09-09 PROBLEM — N60.99 DUCTAL HYPERPLASIA, ATYPICAL, BREAST: Status: ACTIVE | Noted: 2024-09-09

## 2024-09-09 PROBLEM — D24.9 FIBROADENOMA: Status: ACTIVE | Noted: 2024-09-09

## 2024-09-11 ENCOUNTER — APPOINTMENT (OUTPATIENT)
Dept: INTERNAL MEDICINE | Facility: CLINIC | Age: 84
End: 2024-09-11

## 2024-09-13 ENCOUNTER — NON-APPOINTMENT (OUTPATIENT)
Age: 84
End: 2024-09-13

## 2024-09-17 ENCOUNTER — NON-APPOINTMENT (OUTPATIENT)
Age: 84
End: 2024-09-17

## 2024-09-17 ENCOUNTER — APPOINTMENT (OUTPATIENT)
Dept: SURGICAL ONCOLOGY | Facility: CLINIC | Age: 84
End: 2024-09-17
Payer: MEDICARE

## 2024-09-17 VITALS
HEIGHT: 66 IN | OXYGEN SATURATION: 99 % | SYSTOLIC BLOOD PRESSURE: 149 MMHG | WEIGHT: 154 LBS | DIASTOLIC BLOOD PRESSURE: 76 MMHG | BODY MASS INDEX: 24.75 KG/M2 | HEART RATE: 71 BPM

## 2024-09-17 DIAGNOSIS — N60.99 UNSPECIFIED BENIGN MAMMARY DYSPLASIA OF UNSPECIFIED BREAST: ICD-10-CM

## 2024-09-17 DIAGNOSIS — D24.9 BENIGN NEOPLASM OF UNSPECIFIED BREAST: ICD-10-CM

## 2024-09-17 PROCEDURE — 99215 OFFICE O/P EST HI 40 MIN: CPT

## 2024-09-17 NOTE — HISTORY OF PRESENT ILLNESS
[de-identified] : Ms. VADIM STRICKLAND is a 83 year old female who presents today for initial consultation for atypical ductal hyperplasia. This was found as calcifications which led to a stereotactic biopsy.  B/L mammo 7/12/24: Indeterminate calcifications left breast. Stereotactic core needle biopsy is recommended for further evaluation with be scheduled. Stereotactic biopsy. BIRADS 4   Left breast upper inner stereotactic biopsy 9/13/24: Focal atypical ductal hyperplasia with calcifications. Fibroadenoma with calcifications  She has a family hx. of breast cancer in 3 sisters but the patient has been tested and is BRCA negative

## 2024-09-17 NOTE — CONSULT LETTER
[Dear  ___] : Dear  [unfilled], [Consult Letter:] : I had the pleasure of evaluating your patient, [unfilled]. [Please see my note below.] : Please see my note below. [Consult Closing:] : Thank you very much for allowing me to participate in the care of this patient.  If you have any questions, please do not hesitate to contact me. [Sincerely,] : Sincerely, [FreeTextEntry1] : I will keep you informed of the final pathology. [FreeTextEntry3] : Kendell Vazquez MD FACS Chief of Surgical Oncology

## 2024-09-17 NOTE — PHYSICAL EXAM
[Normal] : supple, no neck mass and thyroid not enlarged [Normal Supraclavicular Lymph Nodes] : normal supraclavicular lymph nodes [Normal Axillary Lymph Nodes] : normal axillary lymph nodes [Normal] : oriented to person, place and time, with appropriate affect [de-identified] : mild fiborocystric changes but no masses

## 2024-09-17 NOTE — HISTORY OF PRESENT ILLNESS
[de-identified] : Ms. VADIM STRICKLAND is a 83 year old female who presents today for initial consultation for atypical ductal hyperplasia. This was found as calcifications which led to a stereotactic biopsy.  B/L mammo 7/12/24: Indeterminate calcifications left breast. Stereotactic core needle biopsy is recommended for further evaluation with be scheduled. Stereotactic biopsy. BIRADS 4   Left breast upper inner stereotactic biopsy 9/13/24: Focal atypical ductal hyperplasia with calcifications. Fibroadenoma with calcifications  She has a family hx. of breast cancer in 3 sisters but the patient has been tested and is BRCA negative

## 2024-09-17 NOTE — ASSESSMENT
[FreeTextEntry1] : IMP: 83 year old female presents with left breast ADH   Left breast upper inner stereotactic biopsy 9/13/24: Focal atypical ductal hyperplasia with calcifications. Fibroadenoma with calcifications     PLAN:  Left breast magseed lumpectomy

## 2024-09-17 NOTE — PHYSICAL EXAM
[Normal] : supple, no neck mass and thyroid not enlarged [Normal Supraclavicular Lymph Nodes] : normal supraclavicular lymph nodes [Normal Axillary Lymph Nodes] : normal axillary lymph nodes [Normal] : oriented to person, place and time, with appropriate affect [de-identified] : mild fiborocystric changes but no masses

## 2024-10-09 RX ORDER — PEN NEEDLE, DIABETIC 29 G X1/2"
31G X 8 MM NEEDLE, DISPOSABLE MISCELLANEOUS
Qty: 100 | Refills: 11 | Status: ACTIVE | COMMUNITY
Start: 2024-10-09 | End: 1900-01-01

## 2024-11-05 ENCOUNTER — APPOINTMENT (OUTPATIENT)
Dept: INTERNAL MEDICINE | Facility: CLINIC | Age: 84
End: 2024-11-05
Payer: MEDICARE

## 2024-11-05 VITALS — SYSTOLIC BLOOD PRESSURE: 140 MMHG | DIASTOLIC BLOOD PRESSURE: 80 MMHG

## 2024-11-05 VITALS
DIASTOLIC BLOOD PRESSURE: 60 MMHG | BODY MASS INDEX: 25.07 KG/M2 | HEIGHT: 66 IN | HEART RATE: 70 BPM | WEIGHT: 156 LBS | SYSTOLIC BLOOD PRESSURE: 140 MMHG | OXYGEN SATURATION: 96 %

## 2024-11-05 DIAGNOSIS — B35.3 TINEA PEDIS: ICD-10-CM

## 2024-11-05 DIAGNOSIS — R94.31 ABNORMAL ELECTROCARDIOGRAM [ECG] [EKG]: ICD-10-CM

## 2024-11-05 DIAGNOSIS — I10 ESSENTIAL (PRIMARY) HYPERTENSION: ICD-10-CM

## 2024-11-05 DIAGNOSIS — Z23 ENCOUNTER FOR IMMUNIZATION: ICD-10-CM

## 2024-11-05 DIAGNOSIS — E78.5 HYPERLIPIDEMIA, UNSPECIFIED: ICD-10-CM

## 2024-11-05 DIAGNOSIS — E11.9 TYPE 2 DIABETES MELLITUS W/OUT COMPLICATIONS: ICD-10-CM

## 2024-11-05 DIAGNOSIS — E04.1 NONTOXIC SINGLE THYROID NODULE: ICD-10-CM

## 2024-11-05 DIAGNOSIS — U07.1 COVID-19: ICD-10-CM

## 2024-11-05 DIAGNOSIS — L85.3 XEROSIS CUTIS: ICD-10-CM

## 2024-11-05 PROCEDURE — 99214 OFFICE O/P EST MOD 30 MIN: CPT | Mod: 25

## 2024-11-05 PROCEDURE — 90662 IIV NO PRSV INCREASED AG IM: CPT

## 2024-11-05 PROCEDURE — G0008: CPT

## 2024-11-05 RX ORDER — TERBINAFINE HCL 1 %
1 CREAM (GRAM) TOPICAL 3 TIMES DAILY
Qty: 1 | Refills: 3 | Status: ACTIVE | COMMUNITY
Start: 2024-11-05 | End: 1900-01-01

## 2024-11-05 RX ORDER — AMMONIUM LACTATE 12 %
12 LOTION (GRAM) TOPICAL TWICE DAILY
Qty: 1 | Refills: 11 | Status: ACTIVE | COMMUNITY
Start: 2024-11-05 | End: 1900-01-01

## 2024-11-06 LAB
ALBUMIN SERPL ELPH-MCNC: 4.2 G/DL
ALP BLD-CCNC: 82 U/L
ALT SERPL-CCNC: 18 U/L
ANION GAP SERPL CALC-SCNC: 11 MMOL/L
AST SERPL-CCNC: 21 U/L
BILIRUB SERPL-MCNC: 0.2 MG/DL
BUN SERPL-MCNC: 16 MG/DL
CALCIUM SERPL-MCNC: 9.8 MG/DL
CHLORIDE SERPL-SCNC: 101 MMOL/L
CO2 SERPL-SCNC: 28 MMOL/L
CREAT SERPL-MCNC: 1.11 MG/DL
EGFR: 50 ML/MIN/1.73M2
ESTIMATED AVERAGE GLUCOSE: 206 MG/DL
GLUCOSE SERPL-MCNC: 200 MG/DL
HBA1C MFR BLD HPLC: 8.8 %
POTASSIUM SERPL-SCNC: 4.9 MMOL/L
PROT SERPL-MCNC: 7.7 G/DL
SODIUM SERPL-SCNC: 140 MMOL/L

## 2024-11-18 NOTE — ED PROVIDER NOTE - HIV OFFER
Call from patient.  She states Dr. Lopes recently reinitiate Ribociclib at 200 mg daily 3 out of 4 weeks. Her LFTs were elevated which caused it be held for a bit.  We discussed her recent admission/rehab stay.     The patient had recent visit with urologist; they changed her matias cath and her urinalysis was clear without signs of infection.    The patient's dog recently was missing. Luckily they found him after 11 days.    The patient denies any questions/concerns or ongoing resource needs. Brittney will continue to follow; encouraged patient to call as needed.   
Opt out

## 2024-12-30 ENCOUNTER — APPOINTMENT (OUTPATIENT)
Dept: ENDOCRINOLOGY | Facility: CLINIC | Age: 84
End: 2024-12-30
Payer: MEDICARE

## 2024-12-30 VITALS
SYSTOLIC BLOOD PRESSURE: 135 MMHG | HEART RATE: 70 BPM | DIASTOLIC BLOOD PRESSURE: 82 MMHG | BODY MASS INDEX: 24.59 KG/M2 | HEIGHT: 66 IN | OXYGEN SATURATION: 98 % | WEIGHT: 153 LBS

## 2024-12-30 DIAGNOSIS — E11.9 TYPE 2 DIABETES MELLITUS W/OUT COMPLICATIONS: ICD-10-CM

## 2024-12-30 DIAGNOSIS — E78.5 HYPERLIPIDEMIA, UNSPECIFIED: ICD-10-CM

## 2024-12-30 DIAGNOSIS — I10 ESSENTIAL (PRIMARY) HYPERTENSION: ICD-10-CM

## 2024-12-30 DIAGNOSIS — E04.1 NONTOXIC SINGLE THYROID NODULE: ICD-10-CM

## 2024-12-30 LAB — GLUCOSE BLDC GLUCOMTR-MCNC: 271

## 2024-12-30 PROCEDURE — 99214 OFFICE O/P EST MOD 30 MIN: CPT | Mod: 25

## 2024-12-30 PROCEDURE — 82962 GLUCOSE BLOOD TEST: CPT

## 2025-02-07 NOTE — DISCHARGE NOTE PROVIDER - NSDCHHHOMEBOUND_GEN_ALL_CORE
Called patient regarding imaging scheduled at [facility].  No answer, left message for return call as this imaging needs to be moved to main campus per [facility].   Fall risk

## 2025-02-12 ENCOUNTER — APPOINTMENT (OUTPATIENT)
Dept: INTERNAL MEDICINE | Facility: CLINIC | Age: 85
End: 2025-02-12
Payer: MEDICARE

## 2025-02-12 VITALS — DIASTOLIC BLOOD PRESSURE: 80 MMHG | SYSTOLIC BLOOD PRESSURE: 140 MMHG

## 2025-02-12 VITALS
HEART RATE: 77 BPM | WEIGHT: 154 LBS | DIASTOLIC BLOOD PRESSURE: 80 MMHG | SYSTOLIC BLOOD PRESSURE: 140 MMHG | OXYGEN SATURATION: 96 % | HEIGHT: 66 IN | BODY MASS INDEX: 24.75 KG/M2

## 2025-02-12 DIAGNOSIS — E04.1 NONTOXIC SINGLE THYROID NODULE: ICD-10-CM

## 2025-02-12 DIAGNOSIS — I70.0 ATHEROSCLEROSIS OF AORTA: ICD-10-CM

## 2025-02-12 DIAGNOSIS — G89.29 PAIN IN UNSPECIFIED FOOT: ICD-10-CM

## 2025-02-12 DIAGNOSIS — E11.9 TYPE 2 DIABETES MELLITUS W/OUT COMPLICATIONS: ICD-10-CM

## 2025-02-12 DIAGNOSIS — M79.673 PAIN IN UNSPECIFIED FOOT: ICD-10-CM

## 2025-02-12 DIAGNOSIS — E78.5 HYPERLIPIDEMIA, UNSPECIFIED: ICD-10-CM

## 2025-02-12 DIAGNOSIS — R92.1 MAMMOGRAPHIC CALCIFICATION FOUND ON DIAGNOSTIC IMAGING OF BREAST: ICD-10-CM

## 2025-02-12 DIAGNOSIS — I10 ESSENTIAL (PRIMARY) HYPERTENSION: ICD-10-CM

## 2025-02-12 PROCEDURE — G2211 COMPLEX E/M VISIT ADD ON: CPT

## 2025-02-12 PROCEDURE — 99214 OFFICE O/P EST MOD 30 MIN: CPT

## 2025-06-14 ENCOUNTER — APPOINTMENT (OUTPATIENT)
Dept: CARE COORDINATION | Facility: HOME HEALTH | Age: 85
End: 2025-06-14

## 2025-06-14 PROCEDURE — 99348 HOME/RES VST EST LOW MDM 30: CPT | Mod: 93

## 2025-06-27 PROBLEM — E11.65 TYPE 2 DIABETES MELLITUS WITH HYPERGLYCEMIA, WITH LONG-TERM CURRENT USE OF INSULIN: Status: ACTIVE | Noted: 2025-06-27

## 2025-06-28 PROBLEM — E11.22 TYPE 2 DM WITH CKD STAGE 3 AND HYPERTENSION: Status: ACTIVE | Noted: 2025-06-28

## 2025-06-28 PROBLEM — N18.31 STAGE 3A CHRONIC KIDNEY DISEASE: Status: ACTIVE | Noted: 2025-06-28

## 2025-06-30 ENCOUNTER — APPOINTMENT (OUTPATIENT)
Dept: ENDOCRINOLOGY | Facility: CLINIC | Age: 85
End: 2025-06-30

## 2025-08-15 ENCOUNTER — RX RENEWAL (OUTPATIENT)
Age: 85
End: 2025-08-15

## 2025-08-15 RX ORDER — BLOOD SUGAR DIAGNOSTIC
STRIP MISCELLANEOUS
Qty: 100 | Refills: 11 | Status: ACTIVE | COMMUNITY
Start: 2025-08-15 | End: 1900-01-01